# Patient Record
Sex: FEMALE | Race: BLACK OR AFRICAN AMERICAN | NOT HISPANIC OR LATINO | Employment: FULL TIME | URBAN - METROPOLITAN AREA
[De-identification: names, ages, dates, MRNs, and addresses within clinical notes are randomized per-mention and may not be internally consistent; named-entity substitution may affect disease eponyms.]

---

## 2023-09-13 ENCOUNTER — LAB (OUTPATIENT)
Dept: LAB | Facility: LAB | Age: 33
End: 2023-09-13

## 2023-09-13 LAB
ANTICARDIOLIPIN IGA ANTIBODY: 1.1 APL U/ML (ref 0–20)
ANTICARDIOLIPIN IGG ANTIBODY: <1.6 GPL U/ML (ref 0–20)
ANTICARDIOLIPIN IGM ANTIBODY: 0.2 MPL U/ML (ref 0–20)
BETA 2 GLYCOPROTEIN 1 IGA AB IN SERUM: 1 U/ML (ref 0–20)
BETA 2 GLYCOPROTEIN 1 IGG AB IN SERUM: <1.4 U/ML (ref 0–20)
BETA 2 GLYCOPROTEIN 1 IGM ANTIBODY IN SERUM: 0.4 U/ML (ref 0–20)
DEHYDROEPIANDROSTERONE SULFATE (DHEA-S) (UG/DL) IN SER/: 125 UG/DL (ref 12–379)
ERYTHROCYTE DISTRIBUTION WIDTH (RATIO) BY AUTOMATED COUNT: 15.9 % (ref 11.5–14.5)
ERYTHROCYTE MEAN CORPUSCULAR HEMOGLOBIN CONCENTRATION (G/DL) BY AUTOMATED: 29.1 G/DL (ref 32–36)
ERYTHROCYTE MEAN CORPUSCULAR VOLUME (FL) BY AUTOMATED COUNT: 73 FL (ref 80–100)
ERYTHROCYTES (10*6/UL) IN BLOOD BY AUTOMATED COUNT: 4.59 X10E12/L (ref 4–5.2)
ESTIMATED AVERAGE GLUCOSE FOR HBA1C: 114 MG/DL
FOLLITROPIN (IU/L) IN SER/PLAS: 7.9 IU/L
HEMATOCRIT (%) IN BLOOD BY AUTOMATED COUNT: 33.7 % (ref 36–46)
HEMOGLOBIN (G/DL) IN BLOOD: 9.8 G/DL (ref 12–16)
HEMOGLOBIN A1C/HEMOGLOBIN TOTAL IN BLOOD: 5.6 %
HEPATITIS B VIRUS SURFACE AG PRESENCE IN SERUM: NONREACTIVE
HEPATITIS C VIRUS AB PRESENCE IN SERUM: NONREACTIVE
HIV 1/ 2 AG/AB SCREEN: NONREACTIVE
LEUKOCYTES (10*3/UL) IN BLOOD BY AUTOMATED COUNT: 3.8 X10E9/L (ref 4.4–11.3)
NRBC (PER 100 WBCS) BY AUTOMATED COUNT: 0 /100 WBC (ref 0–0)
PLATELETS (10*3/UL) IN BLOOD AUTOMATED COUNT: 410 X10E9/L (ref 150–450)
PROLACTIN (UG/L) IN SER/PLAS: 8.3 UG/L (ref 3–20)
SYPHILIS TOTAL AB: NONREACTIVE
THYROTROPIN (MIU/L) IN SER/PLAS BY DETECTION LIMIT <= 0.05 MIU/L: 1.21 MIU/L (ref 0.44–3.98)

## 2023-09-14 LAB
ANA PATTERN: ABNORMAL
ANA TITER: ABNORMAL
ANTI-CENTROMERE: <0.2 AI
ANTI-CHROMATIN: <0.2 AI
ANTI-DNA (DS): <1 IU/ML
ANTI-JO-1 IGG: <0.2 AI
ANTI-NUCLEAR ANTIBODY (ANA): POSITIVE
ANTI-RIBOSOMAL P: <0.2 AI
ANTI-RNP: 0.3 AI
ANTI-SCL-70: <0.2 AI
ANTI-SM/RNP: <0.2 AI
ANTI-SM: <0.2 AI
ANTI-SSA: >8 AI
ANTI-SSB: 0.4 AI
CHLAMYDIA TRACH., AMPLIFIED: NEGATIVE
DILUTE RUSSELL VIPER VENOM TIME CONF: 0.97 RATIO
DILUTE RUSSELL VIPER VENOM TIME SCREEN: 0.9 RATIO
DILUTE RUSSELL VIPER VENOM TIME TEST RATIO: 0.93 RATIO
LUPUS ANTICOAGULANT INTERPRETATION: NORMAL
N. GONORRHEA, AMPLIFIED: NEGATIVE
NORMALIZED SILICA CLOTTING TIME (RATIO) OF PPP: 0.8 RATIO
SILICA CLOTTING TIME CONFIRMATION: 0.92 RATIO
SILICA CLOTTING TIME SCREEN: 0.74 RATIO
TRICHOMONAS VAGINALIS: NEGATIVE

## 2023-09-19 LAB
PROTEIN C ACTUAL/NORMAL IN PPP BY COAGULATION ASSAY: 102 %ACTIVITY (ref 70–150)
PROTEIN S ACTUAL/NORMAL IN PPP BY COAGULATION ASSAY: 87 %ACTIVITY (ref 64–150)
TESTOSTERONE FREE (CHAN): 1.8 PG/ML (ref 0.1–6.4)
TESTOSTERONE,TOTAL,LC-MS/MS: 20 NG/DL (ref 2–45)

## 2023-09-27 ENCOUNTER — HOSPITAL ENCOUNTER (INPATIENT)
Dept: DATA CONVERSION | Facility: HOSPITAL | Age: 33
LOS: 4 days | Discharge: HOME | DRG: 864 | End: 2023-10-01
Attending: INTERNAL MEDICINE | Admitting: INTERNAL MEDICINE

## 2023-09-27 DIAGNOSIS — D25.1 INTRAMURAL AND SUBMUCOUS LEIOMYOMA OF UTERUS: ICD-10-CM

## 2023-09-27 DIAGNOSIS — R50.9 FEVER OF UNKNOWN ORIGIN: Primary | ICD-10-CM

## 2023-09-27 DIAGNOSIS — D25.0 INTRAMURAL AND SUBMUCOUS LEIOMYOMA OF UTERUS: ICD-10-CM

## 2023-09-27 DIAGNOSIS — D64.9 ANEMIA, UNSPECIFIED TYPE: Chronic | ICD-10-CM

## 2023-09-27 LAB
ALBUMIN SERPL-MCNC: 3.4 GM/DL (ref 3.5–5)
ALBUMIN/GLOB SERPL: 0.7 RATIO (ref 1.5–3)
ALP BLD-CCNC: 93 U/L (ref 35–125)
ALT SERPL-CCNC: 37 U/L (ref 5–40)
ANION GAP SERPL CALCULATED.3IONS-SCNC: 12 MMOL/L (ref 0–19)
AST SERPL-CCNC: 29 U/L (ref 5–40)
BACTERIA SPEC CULT: NORMAL
BACTERIA UR QL AUTO: POSITIVE
BASOPHILS # BLD AUTO: 0.01 K/UL (ref 0–0.22)
BASOPHILS NFR BLD AUTO: 0.1 % (ref 0–1)
BILIRUB SERPL-MCNC: 0.7 MG/DL (ref 0.1–1.2)
BILIRUB UR QL STRIP.AUTO: NEGATIVE
BLOOD CULTURE RESULTS -LH SQ DATA CONVERSION: NORMAL
BLOOD CULTURE RESULTS -LH SQ DATA CONVERSION: NORMAL
BUN SERPL-MCNC: 9 MG/DL (ref 8–25)
BUN/CREAT SERPL: 10 RATIO (ref 8–21)
CALCIUM SERPL-MCNC: 8.9 MG/DL (ref 8.5–10.4)
CC # UR: NORMAL /UL
CHLORIDE SERPL-SCNC: 93 MMOL/L (ref 97–107)
CLARITY UR: CLEAR
CO2 SERPL-SCNC: 23 MMOL/L (ref 24–31)
COLOR UR: YELLOW
CREAT SERPL-MCNC: 0.9 MG/DL (ref 0.4–1.6)
DEPRECATED RDW RBC AUTO: 40.4 FL (ref 37–54)
DIFFERENTIAL METHOD BLD: ABNORMAL
EOSINOPHIL # BLD AUTO: 0.01 K/UL (ref 0–0.45)
EOSINOPHIL NFR BLD: 0.1 % (ref 0–3)
ERYTHROCYTE [DISTWIDTH] IN BLOOD BY AUTOMATED COUNT: 16.3 % (ref 11.7–15)
GFR SERPL CREATININE-BSD FRML MDRD: 88 ML/MIN/1.73 M2
GLOBULIN SER-MCNC: 5.2 G/DL (ref 1.9–3.7)
GLUCOSE SERPL-MCNC: 118 MG/DL (ref 65–99)
GLUCOSE UR STRIP.AUTO-MCNC: NEGATIVE MG/DL
HCG SERPL-ACNC: 1 MIU/ML
HCT VFR BLD AUTO: 25.4 % (ref 36–44)
HGB BLD-MCNC: 8 GM/DL (ref 12–15)
HGB UR QL STRIP.AUTO: 77 /HPF (ref 0–3)
HGB UR QL: ABNORMAL
HYALINE CASTS UR QL AUTO: ABNORMAL /LPF
IMM GRANULOCYTES # BLD AUTO: 0.07 K/UL (ref 0–0.1)
KETONES UR QL STRIP.AUTO: NEGATIVE
LACTATE BLDV-SCNC: 1.2 MMOL/L (ref 0.4–2)
LEUKOCYTE ESTERASE UR QL STRIP.AUTO: ABNORMAL
LIPASE SERPL-CCNC: 16 U/L (ref 16–63)
LYMPHOCYTES # BLD AUTO: 0.8 K/UL (ref 1.2–3.2)
LYMPHOCYTES NFR BLD MANUAL: 8 % (ref 20–40)
MCH RBC QN AUTO: 21.6 PG (ref 26–34)
MCHC RBC AUTO-ENTMCNC: 31.5 % (ref 31–37)
MCV RBC AUTO: 68.5 FL (ref 80–100)
MICROSCOPIC (UA): ABNORMAL
MONOCYTES # BLD AUTO: 1.1 K/UL (ref 0–0.8)
MONOCYTES NFR BLD MANUAL: 11 % (ref 0–8)
NEUTROPHILS # BLD AUTO: 7.99 K/UL
NEUTROPHILS # BLD AUTO: 7.99 K/UL (ref 1.8–7.7)
NEUTROPHILS.IMMATURE NFR BLD: 0.7 % (ref 0–1)
NEUTS SEG NFR BLD: 80.1 % (ref 50–70)
NITRITE UR QL STRIP.AUTO: NEGATIVE
NRBC BLD-RTO: 0 /100 WBC
PH UR STRIP.AUTO: 6 [PH] (ref 4.6–8)
PLATELET # BLD AUTO: 474 K/UL (ref 150–450)
PMV BLD AUTO: 10.2 CU (ref 7–12.6)
POTASSIUM SERPL-SCNC: 3.5 MMOL/L (ref 3.4–5.1)
PROT SERPL-MCNC: 8.6 G/DL (ref 5.9–7.9)
PROT UR STRIP.AUTO-MCNC: 50 MG/DL
RBC # BLD AUTO: 3.71 M/UL (ref 4–4.9)
REPORT STATUS -LH SQ DATA CONVERSION: NORMAL
SERVICE CMNT-IMP: NORMAL
SODIUM SERPL-SCNC: 128 MMOL/L (ref 133–145)
SOURCE,MICRO -LH SQ DATA CONVERSION: NORMAL
SOURCE,MICRO -LH SQ DATA CONVERSION: NORMAL
SP GR UR STRIP.AUTO: 1.03 (ref 1–1.03)
SPECIMEN SOURCE: NORMAL
SQUAMOUS UR QL AUTO: ABNORMAL /HPF
URINE CULTURE: ABNORMAL
UROBILINOGEN UR QL STRIP.AUTO: 2 MG/DL (ref 0–1)
WBC # BLD AUTO: 10 K/UL (ref 4.5–11)
WBC #/AREA URNS AUTO: 14 /HPF (ref 0–3)

## 2023-09-27 PROCEDURE — 85025 COMPLETE CBC W/AUTO DIFF WBC: CPT

## 2023-09-27 PROCEDURE — 9990 CHARGE CONVERSION

## 2023-09-27 PROCEDURE — REL CHARGE CONVERSION

## 2023-09-27 PROCEDURE — 83690 ASSAY OF LIPASE: CPT

## 2023-09-27 PROCEDURE — 96375 TX/PRO/DX INJ NEW DRUG ADDON: CPT | Mod: XU

## 2023-09-27 PROCEDURE — G1004 CDSM NDSC: HCPCS

## 2023-09-27 PROCEDURE — 76856 US EXAM PELVIC COMPLETE: CPT

## 2023-09-27 PROCEDURE — 83605 ASSAY OF LACTIC ACID: CPT

## 2023-09-27 PROCEDURE — 96366 THER/PROPH/DIAG IV INF ADDON: CPT

## 2023-09-27 PROCEDURE — 87086 URINE CULTURE/COLONY COUNT: CPT

## 2023-09-27 PROCEDURE — 74177 CT ABD & PELVIS W/CONTRAST: CPT | Mod: ME

## 2023-09-27 PROCEDURE — 99285 EMERGENCY DEPT VISIT HI MDM: CPT | Mod: 25

## 2023-09-27 PROCEDURE — 82728 ASSAY OF FERRITIN: CPT

## 2023-09-27 PROCEDURE — 93975 VASCULAR STUDY: CPT

## 2023-09-27 PROCEDURE — 83550 IRON BINDING TEST: CPT

## 2023-09-27 PROCEDURE — 84702 CHORIONIC GONADOTROPIN TEST: CPT

## 2023-09-27 PROCEDURE — 93005 ELECTROCARDIOGRAM TRACING: CPT

## 2023-09-27 PROCEDURE — 83540 ASSAY OF IRON: CPT

## 2023-09-27 PROCEDURE — 36415 COLL VENOUS BLD VENIPUNCTURE: CPT

## 2023-09-27 PROCEDURE — 87040 BLOOD CULTURE FOR BACTERIA: CPT

## 2023-09-27 PROCEDURE — 82947 ASSAY GLUCOSE BLOOD QUANT: CPT

## 2023-09-27 PROCEDURE — 81001 URINALYSIS AUTO W/SCOPE: CPT

## 2023-09-27 PROCEDURE — 96365 THER/PROPH/DIAG IV INF INIT: CPT | Mod: XU

## 2023-09-27 PROCEDURE — 80053 COMPREHEN METABOLIC PANEL: CPT

## 2023-09-28 LAB
ANION GAP SERPL CALCULATED.3IONS-SCNC: 15 MMOL/L (ref 0–19)
BUN SERPL-MCNC: 8 MG/DL (ref 8–25)
BUN/CREAT SERPL: 11.4 RATIO (ref 8–21)
CALCIUM SERPL-MCNC: 8.4 MG/DL (ref 8.5–10.4)
CHLORIDE SERPL-SCNC: 102 MMOL/L (ref 97–107)
CO2 SERPL-SCNC: 18 MMOL/L (ref 24–31)
CREAT SERPL-MCNC: 0.7 MG/DL (ref 0.4–1.6)
DEPRECATED RDW RBC AUTO: 41.5 FL (ref 37–54)
ERYTHROCYTE [DISTWIDTH] IN BLOOD BY AUTOMATED COUNT: 16.4 % (ref 11.7–15)
FERRITIN SERPL-MCNC: 161 NG/ML (ref 13–150)
GFR SERPL CREATININE-BSD FRML MDRD: 119 ML/MIN/1.73 M2
GLUCOSE SERPL-MCNC: 96 MG/DL (ref 65–99)
HCT VFR BLD AUTO: 24.8 % (ref 36–44)
HGB BLD-MCNC: 7.5 GM/DL (ref 12–15)
IRON SATN MFR SERPL: 7.5 % (ref 12–50)
IRON SERPL-MCNC: 20 UG/DL (ref 30–160)
MCH RBC QN AUTO: 20.9 PG (ref 26–34)
MCHC RBC AUTO-ENTMCNC: 30.2 % (ref 31–37)
MCV RBC AUTO: 69.3 FL (ref 80–100)
NOTE (COV): NORMAL
NRBC BLD-RTO: 0 /100 WBC
PLATELET # BLD AUTO: 501 K/UL (ref 150–450)
PMV BLD AUTO: 9.9 CU (ref 7–12.6)
POTASSIUM SERPL-SCNC: 3.9 MMOL/L (ref 3.4–5.1)
RBC # BLD AUTO: 3.58 M/UL (ref 4–4.9)
SARS-COV-2 RNA RESP QL NAA+PROBE: NEGATIVE
SODIUM SERPL-SCNC: 135 MMOL/L (ref 133–145)
SPECIMEN SOURCE (COV): NORMAL
TIBC SERPL-MCNC: 268 UG/DL (ref 228–428)
WBC # BLD AUTO: 13 K/UL (ref 4.5–11)

## 2023-09-28 PROCEDURE — 81001 URINALYSIS AUTO W/SCOPE: CPT

## 2023-09-28 PROCEDURE — 9990 CHARGE CONVERSION

## 2023-09-28 PROCEDURE — 84702 CHORIONIC GONADOTROPIN TEST: CPT

## 2023-09-28 PROCEDURE — 80048 BASIC METABOLIC PNL TOTAL CA: CPT

## 2023-09-28 PROCEDURE — 83605 ASSAY OF LACTIC ACID: CPT

## 2023-09-28 PROCEDURE — 87635 SARS-COV-2 COVID-19 AMP PRB: CPT

## 2023-09-28 PROCEDURE — 83690 ASSAY OF LIPASE: CPT

## 2023-09-28 PROCEDURE — 80053 COMPREHEN METABOLIC PANEL: CPT

## 2023-09-28 PROCEDURE — 85027 COMPLETE CBC AUTOMATED: CPT

## 2023-09-28 PROCEDURE — 85025 COMPLETE CBC W/AUTO DIFF WBC: CPT

## 2023-09-28 PROCEDURE — 36415 COLL VENOUS BLD VENIPUNCTURE: CPT

## 2023-09-28 RX ORDER — POLYETHYLENE GLYCOL 3350 17 G/17G
17 POWDER, FOR SOLUTION ORAL DAILY PRN
Status: DISCONTINUED | OUTPATIENT
Start: 2023-09-30 | End: 2023-09-29 | Stop reason: HOSPADM

## 2023-09-28 RX ORDER — ONDANSETRON HYDROCHLORIDE 2 MG/ML
4 INJECTION, SOLUTION INTRAVENOUS EVERY 6 HOURS PRN
Status: DISCONTINUED | OUTPATIENT
Start: 2023-09-30 | End: 2023-09-29 | Stop reason: HOSPADM

## 2023-09-28 RX ORDER — ADHESIVE BANDAGE
30 BANDAGE TOPICAL DAILY PRN
Status: DISCONTINUED | OUTPATIENT
Start: 2023-09-30 | End: 2023-09-29 | Stop reason: HOSPADM

## 2023-09-28 RX ORDER — CEFTRIAXONE 1 G/50ML
1 INJECTION, SOLUTION INTRAVENOUS EVERY 24 HOURS
Status: DISCONTINUED | OUTPATIENT
Start: 2023-09-30 | End: 2023-09-29 | Stop reason: HOSPADM

## 2023-09-28 RX ORDER — ACETAMINOPHEN 325 MG/1
650 TABLET ORAL EVERY 6 HOURS PRN
Status: DISCONTINUED | OUTPATIENT
Start: 2023-09-30 | End: 2023-09-29 | Stop reason: HOSPADM

## 2023-09-28 RX ORDER — GUAIFENESIN 100 MG/5ML
100 SOLUTION ORAL EVERY 4 HOURS PRN
Status: DISCONTINUED | OUTPATIENT
Start: 2023-09-30 | End: 2023-09-29 | Stop reason: HOSPADM

## 2023-09-28 RX ORDER — AMOXICILLIN 250 MG
2 CAPSULE ORAL NIGHTLY PRN
Status: DISCONTINUED | OUTPATIENT
Start: 2023-09-30 | End: 2023-09-29 | Stop reason: HOSPADM

## 2023-09-28 RX ORDER — ALUMINUM HYDROXIDE, MAGNESIUM HYDROXIDE, AND SIMETHICONE 1200; 120; 1200 MG/30ML; MG/30ML; MG/30ML
30 SUSPENSION ORAL EVERY 4 HOURS PRN
Status: DISCONTINUED | OUTPATIENT
Start: 2023-09-30 | End: 2023-09-29 | Stop reason: HOSPADM

## 2023-09-29 LAB
ANION GAP SERPL CALCULATED.3IONS-SCNC: 13 MMOL/L (ref 0–19)
BLOOD CULTURE RESULTS -LH SQ DATA CONVERSION: NORMAL
BLOOD CULTURE RESULTS -LH SQ DATA CONVERSION: NORMAL
BUN SERPL-MCNC: 6 MG/DL (ref 8–25)
BUN/CREAT SERPL: 7.5 RATIO (ref 8–21)
C TRACH RRNA SPEC QL NAA+PROBE: ABNORMAL
CALCIUM SERPL-MCNC: 8.4 MG/DL (ref 8.5–10.4)
CHLORIDE SERPL-SCNC: 101 MMOL/L (ref 97–107)
CO2 SERPL-SCNC: 21 MMOL/L (ref 24–31)
CREAT SERPL-MCNC: 0.8 MG/DL (ref 0.4–1.6)
DEPRECATED RDW RBC AUTO: 42.5 FL (ref 37–54)
DRUG SCREEN COMMENT UR-IMP: ABNORMAL
ERYTHROCYTE [DISTWIDTH] IN BLOOD BY AUTOMATED COUNT: 16.7 % (ref 11.7–15)
GFR SERPL CREATININE-BSD FRML MDRD: 101 ML/MIN/1.73 M2
GLUCOSE SERPL-MCNC: 102 MG/DL (ref 65–99)
HCT VFR BLD AUTO: 23.3 % (ref 36–44)
HGB BLD-MCNC: 7.1 GM/DL (ref 12–15)
MCH RBC QN AUTO: 21.3 PG (ref 26–34)
MCHC RBC AUTO-ENTMCNC: 30.5 % (ref 31–37)
MCV RBC AUTO: 70 FL (ref 80–100)
N GONORRHOEA RRNA SPEC QL NAA+PROBE: ABNORMAL
NRBC BLD-RTO: 0 /100 WBC
PLATELET # BLD AUTO: 522 K/UL (ref 150–450)
PMV BLD AUTO: 10.5 CU (ref 7–12.6)
POTASSIUM SERPL-SCNC: 3.4 MMOL/L (ref 3.4–5.1)
RBC # BLD AUTO: 3.33 M/UL (ref 4–4.9)
SODIUM SERPL-SCNC: 135 MMOL/L (ref 133–145)
SOURCE,MICRO -LH SQ DATA CONVERSION: NORMAL
SOURCE,MICRO -LH SQ DATA CONVERSION: NORMAL
SPECIMEN SOURCE: ABNORMAL
WBC # BLD AUTO: 11.2 K/UL (ref 4.5–11)

## 2023-09-29 PROCEDURE — 82728 ASSAY OF FERRITIN: CPT

## 2023-09-29 PROCEDURE — 85027 COMPLETE CBC AUTOMATED: CPT

## 2023-09-29 PROCEDURE — 9990 CHARGE CONVERSION

## 2023-09-29 PROCEDURE — 87635 SARS-COV-2 COVID-19 AMP PRB: CPT

## 2023-09-29 PROCEDURE — 87591 N.GONORRHOEAE DNA AMP PROB: CPT

## 2023-09-29 PROCEDURE — 87040 BLOOD CULTURE FOR BACTERIA: CPT

## 2023-09-29 PROCEDURE — 83540 ASSAY OF IRON: CPT

## 2023-09-29 PROCEDURE — 83550 IRON BINDING TEST: CPT

## 2023-09-29 PROCEDURE — 80048 BASIC METABOLIC PNL TOTAL CA: CPT

## 2023-09-29 PROCEDURE — 87491 CHLMYD TRACH DNA AMP PROBE: CPT

## 2023-09-30 PROBLEM — D64.9 ANEMIA: Chronic | Status: ACTIVE | Noted: 2023-09-30

## 2023-09-30 PROBLEM — D25.9 UTERINE FIBROID: Status: ACTIVE | Noted: 2023-09-30

## 2023-09-30 PROBLEM — R50.9 FEVER OF UNKNOWN ORIGIN: Status: ACTIVE | Noted: 2023-09-30

## 2023-09-30 PROBLEM — N83.201 CYST OF RIGHT OVARY: Status: ACTIVE | Noted: 2023-09-30

## 2023-09-30 LAB
ANION GAP SERPL CALC-SCNC: 11 MMOL/L
BASOPHILS # BLD AUTO: 0.02 X10*3/UL (ref 0–0.1)
BASOPHILS NFR BLD AUTO: 0.2 %
BUN SERPL-MCNC: 5 MG/DL (ref 8–25)
CALCIUM SERPL-MCNC: 8.6 MG/DL (ref 8.5–10.4)
CHLORIDE SERPL-SCNC: 96 MMOL/L (ref 97–107)
CO2 SERPL-SCNC: 25 MMOL/L (ref 24–31)
CREAT SERPL-MCNC: 0.6 MG/DL (ref 0.4–1.6)
EOSINOPHIL # BLD AUTO: 0.14 X10*3/UL (ref 0–0.7)
EOSINOPHIL NFR BLD AUTO: 1.2 %
ERYTHROCYTE [DISTWIDTH] IN BLOOD BY AUTOMATED COUNT: 17 % (ref 11.5–14.5)
GFR SERPL CREATININE-BSD FRML MDRD: >90 ML/MIN/1.73M*2
GLUCOSE SERPL-MCNC: 112 MG/DL (ref 65–99)
HCT VFR BLD AUTO: 23.6 % (ref 36–46)
HGB BLD-MCNC: 7.1 G/DL (ref 12–16)
IMM GRANULOCYTES # BLD AUTO: 0.12 X10*3/UL (ref 0–0.7)
IMM GRANULOCYTES NFR BLD AUTO: 1.1 % (ref 0–0.9)
LYMPHOCYTES # BLD AUTO: 0.88 X10*3/UL (ref 1.2–4.8)
LYMPHOCYTES NFR BLD AUTO: 7.7 %
MCH RBC QN AUTO: 20.8 PG (ref 26–34)
MCHC RBC AUTO-ENTMCNC: 30.1 G/DL (ref 32–36)
MCV RBC AUTO: 69 FL (ref 80–100)
MONOCYTES # BLD AUTO: 1.27 X10*3/UL (ref 0.1–1)
MONOCYTES NFR BLD AUTO: 11.2 %
NEUTROPHILS # BLD AUTO: 8.93 X10*3/UL (ref 1.2–7.7)
NEUTROPHILS NFR BLD AUTO: 78.6 %
NRBC BLD-RTO: 0 /100 WBCS (ref 0–0)
PLATELET # BLD AUTO: 577 X10*3/UL (ref 150–450)
PMV BLD AUTO: 10.3 FL (ref 7.5–11.5)
POTASSIUM SERPL-SCNC: 3.5 MMOL/L (ref 3.4–5.1)
RBC # BLD AUTO: 3.41 X10*6/UL (ref 4–5.2)
SODIUM SERPL-SCNC: 132 MMOL/L (ref 133–145)
WBC # BLD AUTO: 11.4 X10*3/UL (ref 4.4–11.3)

## 2023-09-30 PROCEDURE — 2500000004 HC RX 250 GENERAL PHARMACY W/ HCPCS (ALT 636 FOR OP/ED): Performed by: INTERNAL MEDICINE

## 2023-09-30 PROCEDURE — 85027 COMPLETE CBC AUTOMATED: CPT

## 2023-09-30 PROCEDURE — 87040 BLOOD CULTURE FOR BACTERIA: CPT

## 2023-09-30 PROCEDURE — 36415 COLL VENOUS BLD VENIPUNCTURE: CPT | Performed by: OBSTETRICS & GYNECOLOGY

## 2023-09-30 PROCEDURE — 9990 CHARGE CONVERSION

## 2023-09-30 PROCEDURE — 1200000002 HC GENERAL ROOM WITH TELEMETRY DAILY

## 2023-09-30 PROCEDURE — 85025 COMPLETE CBC W/AUTO DIFF WBC: CPT | Performed by: OBSTETRICS & GYNECOLOGY

## 2023-09-30 PROCEDURE — 87086 URINE CULTURE/COLONY COUNT: CPT

## 2023-09-30 PROCEDURE — 80048 BASIC METABOLIC PNL TOTAL CA: CPT | Performed by: OBSTETRICS & GYNECOLOGY

## 2023-09-30 PROCEDURE — REL CHARGE CONVERSION

## 2023-09-30 PROCEDURE — 80048 BASIC METABOLIC PNL TOTAL CA: CPT

## 2023-09-30 RX ORDER — CEFTRIAXONE 1 G/50ML
1 INJECTION, SOLUTION INTRAVENOUS EVERY 24 HOURS
Status: DISCONTINUED | OUTPATIENT
Start: 2023-09-30 | End: 2023-09-30

## 2023-09-30 RX ORDER — ACETAMINOPHEN 325 MG/1
650 TABLET ORAL EVERY 6 HOURS PRN
Status: DISCONTINUED | OUTPATIENT
Start: 2023-09-30 | End: 2023-10-01 | Stop reason: HOSPADM

## 2023-09-30 RX ORDER — ALUMINUM HYDROXIDE, MAGNESIUM HYDROXIDE, AND SIMETHICONE 1200; 120; 1200 MG/30ML; MG/30ML; MG/30ML
30 SUSPENSION ORAL EVERY 4 HOURS PRN
Status: DISCONTINUED | OUTPATIENT
Start: 2023-09-30 | End: 2023-10-01 | Stop reason: HOSPADM

## 2023-09-30 RX ORDER — POLYETHYLENE GLYCOL 3350 17 G/17G
17 POWDER, FOR SOLUTION ORAL DAILY PRN
Status: DISCONTINUED | OUTPATIENT
Start: 2023-09-30 | End: 2023-10-01 | Stop reason: HOSPADM

## 2023-09-30 RX ORDER — ONDANSETRON HYDROCHLORIDE 2 MG/ML
4 INJECTION, SOLUTION INTRAVENOUS EVERY 6 HOURS PRN
Status: DISCONTINUED | OUTPATIENT
Start: 2023-09-30 | End: 2023-10-01 | Stop reason: HOSPADM

## 2023-09-30 RX ORDER — IBUPROFEN 600 MG/1
TABLET ORAL
Status: DISPENSED
Start: 2023-09-30 | End: 2023-09-30

## 2023-09-30 RX ORDER — ADHESIVE BANDAGE
30 BANDAGE TOPICAL DAILY PRN
Status: DISCONTINUED | OUTPATIENT
Start: 2023-09-30 | End: 2023-10-01 | Stop reason: HOSPADM

## 2023-09-30 RX ORDER — AMOXICILLIN 250 MG
2 CAPSULE ORAL NIGHTLY PRN
Status: DISCONTINUED | OUTPATIENT
Start: 2023-09-30 | End: 2023-10-01 | Stop reason: HOSPADM

## 2023-09-30 RX ORDER — IBUPROFEN 600 MG/1
600 TABLET ORAL EVERY 6 HOURS PRN
Status: DISCONTINUED | OUTPATIENT
Start: 2023-09-30 | End: 2023-10-01 | Stop reason: HOSPADM

## 2023-09-30 RX ORDER — GUAIFENESIN 100 MG/5ML
100 SOLUTION ORAL EVERY 4 HOURS PRN
Status: DISCONTINUED | OUTPATIENT
Start: 2023-09-30 | End: 2023-10-01 | Stop reason: HOSPADM

## 2023-09-30 RX ADMIN — PIPERACILLIN SODIUM AND TAZOBACTAM SODIUM 4.5 G: 4; .5 INJECTION, SOLUTION INTRAVENOUS at 21:14

## 2023-09-30 RX ADMIN — PIPERACILLIN SODIUM AND TAZOBACTAM SODIUM 4.5 G: 4; .5 INJECTION, SOLUTION INTRAVENOUS at 15:19

## 2023-09-30 RX ADMIN — PIPERACILLIN SODIUM AND TAZOBACTAM SODIUM 4.5 G: 4; .5 INJECTION, SOLUTION INTRAVENOUS at 06:00

## 2023-09-30 ASSESSMENT — COGNITIVE AND FUNCTIONAL STATUS - GENERAL
MOBILITY SCORE: 24
DAILY ACTIVITIY SCORE: 24

## 2023-09-30 ASSESSMENT — PAIN - FUNCTIONAL ASSESSMENT
PAIN_FUNCTIONAL_ASSESSMENT: 0-10

## 2023-09-30 ASSESSMENT — PAIN SCALES - GENERAL
PAINLEVEL_OUTOF10: 0 - NO PAIN
PAINLEVEL_OUTOF10: 0 - NO PAIN
PAINLEVEL_OUTOF10: 4

## 2023-09-30 NOTE — PROGRESS NOTES
"Daria Isaac is a 32 y.o. female on day 3 of admission presenting with Fever of unknown origin.    Subjective   Interval History:   Afebrile, no chills  Reports moderate pelvic pain  No nausea vomiting or diarrhea    Objective   Physical Exam  General: no acute distress  Psych: awake, alert  Head: Anicteric with no conjunctival injection  ENT: Midline nasal septum with no mucosal lesions  Neck: Supple with no obvious masses  Chest: Clear to auscultation bilaterally with no rales or rhonchi  Cardiovascular system: Regular rate and rhythm with no rubs murmurs or gallops  Abdomen: Soft, nontender, bowel sounds normoactive  Extremities: No edema with no cyanosis or clubbing  Skin: No rash, nodules  Musculoskeletal system no joint swelling or tenderness    Range of Vitals (last 24 hours)  Heart Rate:  [73-93]   Temp:  [36.6 °C (97.9 °F)-37.1 °C (98.8 °F)]   Resp:  [18]   BP: (133-143)/(76-90)   SpO2:  [99 %-100 %]   Daily Weight  09/28/23 : 105 kg (231 lb 7.7 oz)    Body mass index is 32.29 kg/m².    Relevant Results  Labs:  Results from last 72 hours   Lab Units 09/28/23  0806 09/27/23  1653   WBC AUTO K/UL 13.0* 10.0   HEMOGLOBIN GM/DL 7.5* 8.0*   HEMATOCRIT % 24.8* 25.4*   PLATELETS AUTO K/* 474*   LYMPHO PCT MAN %  --  8.00*   MONO PCT MAN %  --  11.00*     Results from last 72 hours   Lab Units 09/28/23  0806   SODIUM MMOL/L 135   POTASSIUM MMOL/L 3.9   CHLORIDE MMOL/L 102   CO2 MMOL/L 18*   BUN MG/DL 8   CREATININE MG/DL 0.7   GLUCOSE MG/DL 96   CALCIUM MG/DL 8.4*   ANION GAP MMOL/L 15   ESTIMATED GFR mL/min/1.73 m2 119     Results from last 72 hours   Lab Units 09/27/23  1653   ALK PHOS U/L 93   BILIRUBIN TOTAL MG/DL 0.7   PROTEIN TOTAL G/DL 8.6*   ALT U/L 37   AST U/L 29   ALBUMIN GM/DL 3.4*     Estimated Creatinine Clearance: 125 mL/min (by C-G formula based on SCr of 0.7 mg/dL).  No results found for: \"CRP\"  No results found for the last 7 days.    Imaging:  CT abdomen pelvis w IV contrast    Result " Date: 9/27/2023  PROCEDURE:         ABD PELV W ORAL AND IV CONTRAST - WCT  3205 REASON FOR EXAM: Abdominal pain, acute, nonlocalized RESULT: MRN: [id number] Patient Name: CLAUDIA MUNOZ STUDY: ABD PELV W ORAL AND IV CONTRAST; 9/27/2023 8:39 pm INDICATION: Right-sided pain with history of ovarian cyst COMPARISON: 08/30/2020. ACCESSION NUMBER(S): [id number] ORDERING CLINICIAN: ZAHEER OSEI TECHNIQUE: Oral contrast in the form of water was administered. 75 ml Omnipaque 350 was injected intravenously. CT of the Abdomen and Pelvis without and with intravenous contrast was performed. Axial, sagittal and coronal reformatted images were reviewed. FINDINGS: Lower Chest: within normal limits. Abdomen: Liver: within normal limits. Bile Ducts: Normal caliber. Gallbladder: No calcified gallstones. Normal caliber wall. Pancreas: within normal limits. Spleen: within normal limits. Adrenals: within normal limits. Kidneys: within normal limits. There is no obstructive uropathy on either side. Pelvis: Reproductive Organs: There is a 9.9 x 10.1 x 11.2 cm right ovarian cyst containing solitary septation with the cyst wall appearing rather thickened. There is a 3.6 x 3.9 x 3.6 cm left ovarian cyst seen.  The uterus is enlarged with a minimally hyperdense mass seen centrally measuring 6 cm in size. Ureters: within normal limits. Bladder: within normal limits. Bowel: Normal caliber. There is no abnormal bowel wall thickening. No appendicitis is seen. Mesenteric Lymph Nodes: No enlarged mesenteric lymph nodes. Peritoneum: No ascites or free air, no fluid collection. Vessels:  within normal limits. Retroperitoneum: within normal limits. Abdominal Wall: within normal limits. Bones: within normal limits. IMPRESSION: Bilateral ovarian cysts, right larger than left with that on the right containing septation. Differential diagnosis would include bilateral endometriomas, chocolate cysts, or even ovarian neoplasms. No ascites or pleural  effusion observed. Enlarged uterus containing a 6 cm minimally hyperdense mass centrally. This could represent large submucosal leiomyoma or endometrial abnormality with endometrial carcinoma not excluded. MACRO: None. Dictation workstation:   [id number] This exam is available in DICOM format to non-affiliated healthcare facilities on a secure media free searchable basis with prior patient authorization.  The patient exposure is reported to a radiation dose index registry.  All CT examinations are performed with one or more of the following dose reduction techniques: Automated Exposure Control, Adjustment of mA and/or KV according to patient size, or use of iterative reconstruction techniques. Original Interpreting Physician:   MARIO NIEVES M.D. Original Transcribed by/Date: MMODAL Sep 27 2023  5:15P Original Electronically Signed by/Date: MARIO NIEVES M.D. Sep 27 2023 10:08P Addendum Interpreting Physician: Addendum Transcribed by/Date: NO ADDENDUM Addendum Electronically Signed by/Date:     US pelvis    Result Date: 9/27/2023  PROCEDURE:         PELVIS NON OB COMPLETE - WUS  2502 REASON FOR EXAM: abd pain RESULT: MRN: [id number] Patient Name: CLAUDIA MUNOZ STUDY: PELVIS NON OB COMPLETE; 9/27/2023 8:10 pm INDICATION: Right-sided pain COMPARISON: None. ACCESSION NUMBER(S): [id number] ORDERING CLINICIAN: ZAHEER OSEI TECHNIQUE: Grayscale and color Doppler imaging of the pelvis were performed. Transabdominal technique was utilized with patient refusing transvaginal study. FINDINGS: Uterus: Size: 11.6 x 8.8 x 7.3 cm. There is heterogeneous mass seen centrally within the enlarged uterus measuring 5.4 x 6.1 x 6.1 cm in size. A normal endometrium is not seen. Ovaries: There is normal color-flow with no sign of ovarian torsion. Right ovary: 11.8 x 10.4 x 11.0 cm. There is an 8.9 x 9.2 x 10.2 cm complex right ovarian cyst containing low-level internal echoes and exhibiting enhanced through transmission. Right ovary  volume: 709.4 ml Left ovary: 5.9 x 4.5 x 6.1 cm. There is a 3.4 x 3.6 x 2.6 cm left ovarian cyst seen. Left ovary volume: 84.4 ml Duplex Doppler interrogation of each ovary including color flow and spectral waveform analysis shows normal arterial and venous flow without ovarian torsion. There is no free fluid in the pelvis. IMPRESSION: Enlarged uterus with a 5.4 x 6.1 x 6.1 cm heterogeneous mass seen centrally within the uterus. This may represent a large submucosal leiomyoma or could represent markedly thickened endometrium with endometrial carcinoma not excluded. 8.9 x 9.2 x 10.2 cm complex right ovarian cyst. I suspect that this most likely represents very large chocolate cyst or endometrioma. 2.6 x 3.4 x 3.6 cm simple left ovarian cyst. No ovarian torsion. MACRO: None. Dictation workstation:   [id number] Original Interpreting Physician:   MARIO NIEVES M.D. Original Transcribed by/Date: MMODAL Sep 27 2023  5:15P Original Electronically Signed by/Date: MARIO NIEVES M.D. Sep 27 2023  9:57P Addendum Interpreting Physician: Addendum Transcribed by/Date: NO ADDENDUM Addendum Electronically Signed by/Date:     1) SIRS,   Clinical Status Improving,   Details rule out sepsis,   Plan IV antibiotics, follow-up blood cultures ;     2) Fever,   Details Unknown etiology, rule out UTI,   Plan Monitor;     3) Pyuria,   Details versus UTI,   Plan IV Zosyn, follow culture;     4) Computed Tomography Result Abnormal,   Details CT abd/pelvis showed bilateral ovarian cysts, right larger than left, enlarged uterus,   Plan GYN consultation;     5) AP,   IV Zosyn  Follow-up urine culture  Follow-up blood cultures  Monitor WBC and temperature  Monitor renal function  GYN follow-up  Plan is to de-escalate to Augmentin, total of 7 days      Phillip Scanlon MD

## 2023-09-30 NOTE — PROGRESS NOTES
"Daria Isaac is a 32 y.o. female on day 3 of admission presenting with Fever of unknown origin. She has known uterine fibroid and right ovarian cyst > 3 months and following with ObGyn outpatient for this. She has had anemia due to abnormal uterine bleeding, currently stable.    Subjective   Pt feeling better overnight. Abdominal discomfort continues, unchanged. She reports minimal vaginal spotting. She reports no more fever or chills symptoms, is urinating, ambulating, tolerating PO.     Objective     Physical Exam  Constitutional:       Appearance: Normal appearance.   HENT:      Head: Normocephalic.      Mouth/Throat:      Mouth: Mucous membranes are moist.   Cardiovascular:      Rate and Rhythm: Normal rate and regular rhythm.   Pulmonary:      Effort: Pulmonary effort is normal.      Breath sounds: Normal breath sounds.   Abdominal:      General: Abdomen is flat.      Palpations: Abdomen is soft.   Genitourinary:     General: Normal vulva.      Vagina: No vaginal discharge.   Musculoskeletal:         General: Normal range of motion.   Skin:     General: Skin is warm.   Neurological:      Mental Status: She is alert.         Last Recorded Vitals  Blood pressure 133/79, pulse 77, temperature 36.7 °C (98.1 °F), temperature source Oral, resp. rate 18, height 1.803 m (5' 11\"), weight 105 kg, SpO2 100 %.  Intake/Output last 3 Shifts:  No intake/output data recorded.    Relevant Results               Assessment/Plan   Active Problems:  There are no active Hospital Problems.      Fever of unknown origin  -last fever at 11pm on 9/28  -Pt on Zosyn and following with infectious disease, will await further recommendations  -Neg urine and blood culture  -am CBC pending    Fibroid uterus  -Bleeding stable  -Follows with outpatient gyn    Ovarian cyst  -Stable since July 23  -Follows with outpatient gyn    Anemia  -Likely related to abnormal uterine bleeding  -Stable, and pt reports minimal vaginal bleeding currently     "     I spent 30 minutes in the professional and overall care of this patient.      Radha Milligan MD

## 2023-10-01 ENCOUNTER — APPOINTMENT (OUTPATIENT)
Dept: RADIOLOGY | Facility: HOSPITAL | Age: 33
End: 2023-10-01

## 2023-10-01 VITALS
HEIGHT: 71 IN | WEIGHT: 231.48 LBS | OXYGEN SATURATION: 96 % | DIASTOLIC BLOOD PRESSURE: 69 MMHG | SYSTOLIC BLOOD PRESSURE: 133 MMHG | HEART RATE: 109 BPM | RESPIRATION RATE: 18 BRPM | TEMPERATURE: 102 F | BODY MASS INDEX: 32.41 KG/M2

## 2023-10-01 LAB
ANION GAP SERPL CALC-SCNC: 10 MMOL/L
BUN SERPL-MCNC: 4 MG/DL (ref 8–25)
CALCIUM SERPL-MCNC: 8.5 MG/DL (ref 8.5–10.4)
CHLORIDE SERPL-SCNC: 100 MMOL/L (ref 97–107)
CO2 SERPL-SCNC: 26 MMOL/L (ref 24–31)
CREAT SERPL-MCNC: 0.6 MG/DL (ref 0.4–1.6)
ERYTHROCYTE [DISTWIDTH] IN BLOOD BY AUTOMATED COUNT: 16.7 % (ref 11.5–14.5)
GFR SERPL CREATININE-BSD FRML MDRD: >90 ML/MIN/1.73M*2
GLUCOSE SERPL-MCNC: 113 MG/DL (ref 65–99)
HCT VFR BLD AUTO: 22.9 % (ref 36–46)
HGB BLD-MCNC: 7 G/DL (ref 12–16)
MCH RBC QN AUTO: 21 PG (ref 26–34)
MCHC RBC AUTO-ENTMCNC: 30.6 G/DL (ref 32–36)
MCV RBC AUTO: 69 FL (ref 80–100)
NRBC BLD-RTO: 0 /100 WBCS (ref 0–0)
PLATELET # BLD AUTO: 637 X10*3/UL (ref 150–450)
PMV BLD AUTO: 9.5 FL (ref 7.5–11.5)
POTASSIUM SERPL-SCNC: 3.4 MMOL/L (ref 3.4–5.1)
RBC # BLD AUTO: 3.33 X10*6/UL (ref 4–5.2)
SODIUM SERPL-SCNC: 136 MMOL/L (ref 133–145)
WBC # BLD AUTO: 10.9 X10*3/UL (ref 4.4–11.3)

## 2023-10-01 PROCEDURE — 2500000001 HC RX 250 WO HCPCS SELF ADMINISTERED DRUGS (ALT 637 FOR MEDICARE OP): Performed by: STUDENT IN AN ORGANIZED HEALTH CARE EDUCATION/TRAINING PROGRAM

## 2023-10-01 PROCEDURE — 2500000004 HC RX 250 GENERAL PHARMACY W/ HCPCS (ALT 636 FOR OP/ED): Performed by: INTERNAL MEDICINE

## 2023-10-01 PROCEDURE — 71250 CT THORAX DX C-: CPT | Mod: MG

## 2023-10-01 PROCEDURE — 96366 THER/PROPH/DIAG IV INF ADDON: CPT

## 2023-10-01 PROCEDURE — 36415 COLL VENOUS BLD VENIPUNCTURE: CPT | Performed by: OBSTETRICS & GYNECOLOGY

## 2023-10-01 PROCEDURE — 2500000001 HC RX 250 WO HCPCS SELF ADMINISTERED DRUGS (ALT 637 FOR MEDICARE OP): Performed by: OBSTETRICS & GYNECOLOGY

## 2023-10-01 PROCEDURE — 96375 TX/PRO/DX INJ NEW DRUG ADDON: CPT | Mod: XU

## 2023-10-01 PROCEDURE — 87040 BLOOD CULTURE FOR BACTERIA: CPT

## 2023-10-01 PROCEDURE — 82947 ASSAY GLUCOSE BLOOD QUANT: CPT

## 2023-10-01 PROCEDURE — 85027 COMPLETE CBC AUTOMATED: CPT | Performed by: OBSTETRICS & GYNECOLOGY

## 2023-10-01 PROCEDURE — 9990 CHARGE CONVERSION

## 2023-10-01 PROCEDURE — G1004 CDSM NDSC: HCPCS

## 2023-10-01 PROCEDURE — 99285 EMERGENCY DEPT VISIT HI MDM: CPT | Mod: 25

## 2023-10-01 PROCEDURE — 80048 BASIC METABOLIC PNL TOTAL CA: CPT | Performed by: OBSTETRICS & GYNECOLOGY

## 2023-10-01 PROCEDURE — 96365 THER/PROPH/DIAG IV INF INIT: CPT | Mod: XU

## 2023-10-01 RX ORDER — AMOXICILLIN AND CLAVULANATE POTASSIUM 875; 125 MG/1; MG/1
1 TABLET, FILM COATED ORAL 2 TIMES DAILY
Qty: 14 TABLET | Refills: 0 | Status: SHIPPED | OUTPATIENT
Start: 2023-10-01 | End: 2023-10-08

## 2023-10-01 RX ORDER — ACETAMINOPHEN 325 MG/1
650 TABLET ORAL EVERY 6 HOURS PRN
Qty: 30 TABLET | Refills: 0 | Status: SHIPPED | OUTPATIENT
Start: 2023-10-01 | End: 2024-05-22 | Stop reason: HOSPADM

## 2023-10-01 RX ORDER — IBUPROFEN 600 MG/1
600 TABLET ORAL EVERY 6 HOURS PRN
Qty: 30 TABLET | Refills: 0 | Status: SHIPPED | OUTPATIENT
Start: 2023-10-01 | End: 2024-05-22 | Stop reason: HOSPADM

## 2023-10-01 RX ORDER — FERROUS SULFATE 325(65) MG
325 TABLET, DELAYED RELEASE (ENTERIC COATED) ORAL
Qty: 60 TABLET | Refills: 0 | Status: SHIPPED | OUTPATIENT
Start: 2023-10-01 | End: 2024-05-02 | Stop reason: WASHOUT

## 2023-10-01 RX ORDER — OXYCODONE AND ACETAMINOPHEN 5; 325 MG/1; MG/1
1 TABLET ORAL EVERY 6 HOURS PRN
Status: DISCONTINUED | OUTPATIENT
Start: 2023-10-01 | End: 2023-10-01 | Stop reason: HOSPADM

## 2023-10-01 RX ADMIN — IBUPROFEN 600 MG: 600 TABLET ORAL at 18:38

## 2023-10-01 RX ADMIN — PIPERACILLIN SODIUM AND TAZOBACTAM SODIUM 4.5 G: 4; .5 INJECTION, SOLUTION INTRAVENOUS at 06:39

## 2023-10-01 RX ADMIN — IBUPROFEN 600 MG: 600 TABLET ORAL at 00:02

## 2023-10-01 RX ADMIN — OXYCODONE AND ACETAMINOPHEN 1 TABLET: 5; 325 TABLET ORAL at 20:01

## 2023-10-01 ASSESSMENT — COGNITIVE AND FUNCTIONAL STATUS - GENERAL
MOBILITY SCORE: 24
DAILY ACTIVITIY SCORE: 24

## 2023-10-01 ASSESSMENT — PAIN SCALES - GENERAL
PAINLEVEL_OUTOF10: 4
PAINLEVEL_OUTOF10: 0 - NO PAIN
PAINLEVEL_OUTOF10: 0 - NO PAIN
PAINLEVEL_OUTOF10: 4
PAINLEVEL_OUTOF10: 0 - NO PAIN

## 2023-10-01 ASSESSMENT — PAIN - FUNCTIONAL ASSESSMENT
PAIN_FUNCTIONAL_ASSESSMENT: 0-10
PAIN_FUNCTIONAL_ASSESSMENT: 0-10

## 2023-10-01 NOTE — NURSING NOTE
Temperature 37.3 at this time. Pt states she feels as if temp is breaking. Concerned that she keeps getting increased temperatures because she really wants to go home tomorrow.

## 2023-10-01 NOTE — NURSING NOTE
Dr. Jonas notified of increased temp 39.5 C and pts request for Motrin over Tylenol.  She said she would add Motrin

## 2023-10-01 NOTE — PROGRESS NOTES
"Daria Isaac is a 32 y.o. female on day 3 of admission presenting with Fever of unknown origin.    Subjective   Interval History:   Interval temperature spike  Resolved abdominal pain  Minimal spotting  No dysuria, frequency, urgency  No nausea vomiting or diarrhea    Objective   Physical Exam  General: no acute distress  Psych: awake, alert  Head: Anicteric with no conjunctival injection  ENT: Midline nasal septum with no mucosal lesions  Neck: Supple with no obvious masses  Chest: Clear to auscultation bilaterally with no rales or rhonchi  Cardiovascular system: Regular rate and rhythm with no rubs murmurs or gallops  Abdomen: Soft, nontender, bowel sounds normoactive  Extremities: No edema with no cyanosis or clubbing  Skin: No rash, nodules  Musculoskeletal system no joint swelling or tenderness    Range of Vitals (last 24 hours)  Heart Rate:  [73-93]   Temp:  [36.6 °C (97.9 °F)-37.1 °C (98.8 °F)]   Resp:  [18]   BP: (133-143)/(76-90)   SpO2:  [99 %-100 %]   Daily Weight  09/28/23 : 105 kg (231 lb 7.7 oz)    Body mass index is 32.29 kg/m².    Relevant Results  Labs:  Results from last 72 hours   Lab Units 09/28/23  0806 09/27/23  1653   WBC AUTO K/UL 13.0* 10.0   HEMOGLOBIN GM/DL 7.5* 8.0*   HEMATOCRIT % 24.8* 25.4*   PLATELETS AUTO K/* 474*   LYMPHO PCT MAN %  --  8.00*   MONO PCT MAN %  --  11.00*     Results from last 72 hours   Lab Units 09/28/23  0806   SODIUM MMOL/L 135   POTASSIUM MMOL/L 3.9   CHLORIDE MMOL/L 102   CO2 MMOL/L 18*   BUN MG/DL 8   CREATININE MG/DL 0.7   GLUCOSE MG/DL 96   CALCIUM MG/DL 8.4*   ANION GAP MMOL/L 15   ESTIMATED GFR mL/min/1.73 m2 119     Results from last 72 hours   Lab Units 09/27/23  1653   ALK PHOS U/L 93   BILIRUBIN TOTAL MG/DL 0.7   PROTEIN TOTAL G/DL 8.6*   ALT U/L 37   AST U/L 29   ALBUMIN GM/DL 3.4*     Estimated Creatinine Clearance: 125 mL/min (by C-G formula based on SCr of 0.7 mg/dL).  No results found for: \"CRP\"  No results found for the last 7 " days.    Imaging:  CT abdomen pelvis w IV contrast    Result Date: 9/27/2023  PROCEDURE:         ABD PELV W ORAL AND IV CONTRAST - WCT  3205 REASON FOR EXAM: Abdominal pain, acute, nonlocalized RESULT: MRN: [id number] Patient Name: CLAUDIA MUNOZ STUDY: ABD PELV W ORAL AND IV CONTRAST; 9/27/2023 8:39 pm INDICATION: Right-sided pain with history of ovarian cyst COMPARISON: 08/30/2020. ACCESSION NUMBER(S): [id number] ORDERING CLINICIAN: ZAHEER OSEI TECHNIQUE: Oral contrast in the form of water was administered. 75 ml Omnipaque 350 was injected intravenously. CT of the Abdomen and Pelvis without and with intravenous contrast was performed. Axial, sagittal and coronal reformatted images were reviewed. FINDINGS: Lower Chest: within normal limits. Abdomen: Liver: within normal limits. Bile Ducts: Normal caliber. Gallbladder: No calcified gallstones. Normal caliber wall. Pancreas: within normal limits. Spleen: within normal limits. Adrenals: within normal limits. Kidneys: within normal limits. There is no obstructive uropathy on either side. Pelvis: Reproductive Organs: There is a 9.9 x 10.1 x 11.2 cm right ovarian cyst containing solitary septation with the cyst wall appearing rather thickened. There is a 3.6 x 3.9 x 3.6 cm left ovarian cyst seen.  The uterus is enlarged with a minimally hyperdense mass seen centrally measuring 6 cm in size. Ureters: within normal limits. Bladder: within normal limits. Bowel: Normal caliber. There is no abnormal bowel wall thickening. No appendicitis is seen. Mesenteric Lymph Nodes: No enlarged mesenteric lymph nodes. Peritoneum: No ascites or free air, no fluid collection. Vessels:  within normal limits. Retroperitoneum: within normal limits. Abdominal Wall: within normal limits. Bones: within normal limits. IMPRESSION: Bilateral ovarian cysts, right larger than left with that on the right containing septation. Differential diagnosis would include bilateral endometriomas, chocolate  cysts, or even ovarian neoplasms. No ascites or pleural effusion observed. Enlarged uterus containing a 6 cm minimally hyperdense mass centrally. This could represent large submucosal leiomyoma or endometrial abnormality with endometrial carcinoma not excluded. MACRO: None. Dictation workstation:   [id number] This exam is available in DICOM format to non-affiliated healthcare facilities on a secure media free searchable basis with prior patient authorization.  The patient exposure is reported to a radiation dose index registry.  All CT examinations are performed with one or more of the following dose reduction techniques: Automated Exposure Control, Adjustment of mA and/or KV according to patient size, or use of iterative reconstruction techniques. Original Interpreting Physician:   MARIO NIEVES M.D. Original Transcribed by/Date: MMODAL Sep 27 2023  5:15P Original Electronically Signed by/Date: MARIO NIEVES M.D. Sep 27 2023 10:08P Addendum Interpreting Physician: Addendum Transcribed by/Date: NO ADDENDUM Addendum Electronically Signed by/Date:     US pelvis    Result Date: 9/27/2023  PROCEDURE:         PELVIS NON OB COMPLETE - WUS  2502 REASON FOR EXAM: abd pain RESULT: MRN: [id number] Patient Name: CLAUDIA MUNOZ STUDY: PELVIS NON OB COMPLETE; 9/27/2023 8:10 pm INDICATION: Right-sided pain COMPARISON: None. ACCESSION NUMBER(S): [id number] ORDERING CLINICIAN: ZAHEER OSEI TECHNIQUE: Grayscale and color Doppler imaging of the pelvis were performed. Transabdominal technique was utilized with patient refusing transvaginal study. FINDINGS: Uterus: Size: 11.6 x 8.8 x 7.3 cm. There is heterogeneous mass seen centrally within the enlarged uterus measuring 5.4 x 6.1 x 6.1 cm in size. A normal endometrium is not seen. Ovaries: There is normal color-flow with no sign of ovarian torsion. Right ovary: 11.8 x 10.4 x 11.0 cm. There is an 8.9 x 9.2 x 10.2 cm complex right ovarian cyst containing low-level internal echoes and  exhibiting enhanced through transmission. Right ovary volume: 709.4 ml Left ovary: 5.9 x 4.5 x 6.1 cm. There is a 3.4 x 3.6 x 2.6 cm left ovarian cyst seen. Left ovary volume: 84.4 ml Duplex Doppler interrogation of each ovary including color flow and spectral waveform analysis shows normal arterial and venous flow without ovarian torsion. There is no free fluid in the pelvis. IMPRESSION: Enlarged uterus with a 5.4 x 6.1 x 6.1 cm heterogeneous mass seen centrally within the uterus. This may represent a large submucosal leiomyoma or could represent markedly thickened endometrium with endometrial carcinoma not excluded. 8.9 x 9.2 x 10.2 cm complex right ovarian cyst. I suspect that this most likely represents very large chocolate cyst or endometrioma. 2.6 x 3.4 x 3.6 cm simple left ovarian cyst. No ovarian torsion. MACRO: None. Dictation workstation:   [id number] Original Interpreting Physician:   MARIO NIEVES M.D. Original Transcribed by/Date: MMODAL Sep 27 2023  5:15P Original Electronically Signed by/Date: MARIO NIEVES M.D. Sep 27 2023  9:57P Addendum Interpreting Physician: Addendum Transcribed by/Date: NO ADDENDUM Addendum Electronically Signed by/Date:     1) SIRS,   Clinical Status Improving,   Details rule out sepsis,   Plan IV antibiotics, follow-up blood cultures ;     2) Fever,   Details Unknown etiology, rule out UTI,   10/1-interval temperature spike  Plan CT chest abdomen pelvis with oral contrast    3) Pyuria,   Details versus UTI,   Plan IV Zosyn, follow culture;     4) Computed Tomography Result Abnormal,   Details CT abd/pelvis showed bilateral ovarian cysts, right larger than left, enlarged uterus,   Plan GYN consultation;     5) AP,   IV Zosyn  CT chest abdomen and pelvis with oral contrast  Monitor WBC and temperature  Monitor renal function  GYN follow-up  Plan is to de-escalate to Augmentin, total of 7 days      Phillip Scanlon MD

## 2023-10-01 NOTE — PROGRESS NOTES
Patient transferred from Hospitalist service ([facility]) at [facility] to Obgyn service at [facility] serveral days ago.  Will sign off and correct registration.              Jef Taylor MD

## 2023-10-01 NOTE — PROGRESS NOTES
"Daria Isaac is a 32 y.o. female on day 4 of admission presenting with Fever of unknown origin.    Subjective   Pt feeling much better. Abdominal discomfort resolved this am. She reports minimal vaginal spotting. She did have fever last night. She is urinating, ambulating, tolerating PO.           Objective     Physical Exam  Constitutional:       Appearance: Normal appearance.   HENT:      Head: Normocephalic and atraumatic.   Cardiovascular:      Rate and Rhythm: Normal rate and regular rhythm.   Pulmonary:      Effort: Pulmonary effort is normal.      Breath sounds: Normal breath sounds.   Abdominal:      Palpations: Abdomen is soft. There is mass.      Tenderness: There is no abdominal tenderness.   Genitourinary:     Vagina: Normal.      Cervix: Dilated.      Uterus: Normal.    Neurological:      Mental Status: She is alert.         Last Recorded Vitals  Blood pressure 141/82, pulse 70, temperature 37 °C (98.6 °F), temperature source Oral, resp. rate 18, height 1.803 m (5' 11\"), weight 105 kg, SpO2 100 %.  Intake/Output last 3 Shifts:  I/O last 3 completed shifts:  In: 1040 (9.9 mL/kg) [P.O.:640; IV Piggyback:400]  Out: 751 (7.2 mL/kg) [Urine:750 (0.2 mL/kg/hr); Stool:1]  Weight: 105 kg     Relevant Results           This patient currently has cardiac telemetry ordered; if you would like to modify or discontinue the telemetry order, click here to go to the orders activity to modify/discontinue the order.    Current medications:    iron sucrose (Venofer) 200 mg in sodium chloride 0.9% 100 mL IV, 200 mg, intravenous, Once  piperacillin-tazobactam, 4.5 g, intravenous, q8h             Results for orders placed or performed during the hospital encounter of 09/27/23 (from the past 24 hour(s))   CBC and Auto Differential   Result Value Ref Range    WBC 11.4 (H) 4.4 - 11.3 x10*3/uL    nRBC 0.0 0.0 - 0.0 /100 WBCs    RBC 3.41 (L) 4.00 - 5.20 x10*6/uL    Hemoglobin 7.1 (L) 12.0 - 16.0 g/dL    Hematocrit 23.6 (L) 36.0 " - 46.0 %    MCV 69 (L) 80 - 100 fL    MCH 20.8 (L) 26.0 - 34.0 pg    MCHC 30.1 (L) 32.0 - 36.0 g/dL    RDW 17.0 (H) 11.5 - 14.5 %    Platelets 577 (H) 150 - 450 x10*3/uL    MPV 10.3 7.5 - 11.5 fL    Neutrophils % 78.6 40.0 - 80.0 %    Immature Granulocytes %, Automated 1.1 (H) 0.0 - 0.9 %    Lymphocytes % 7.7 13.0 - 44.0 %    Monocytes % 11.2 2.0 - 10.0 %    Eosinophils % 1.2 0.0 - 6.0 %    Basophils % 0.2 0.0 - 2.0 %    Neutrophils Absolute 8.93 (H) 1.20 - 7.70 x10*3/uL    Immature Granulocytes Absolute, Automated 0.12 0.00 - 0.70 x10*3/uL    Lymphocytes Absolute 0.88 (L) 1.20 - 4.80 x10*3/uL    Monocytes Absolute 1.27 (H) 0.10 - 1.00 x10*3/uL    Eosinophils Absolute 0.14 0.00 - 0.70 x10*3/uL    Basophils Absolute 0.02 0.00 - 0.10 x10*3/uL   Basic Metabolic Panel   Result Value Ref Range    Glucose 112 (H) 65 - 99 mg/dL    Sodium 132 (L) 133 - 145 mmol/L    Potassium 3.5 3.4 - 5.1 mmol/L    Chloride 96 (L) 97 - 107 mmol/L    Bicarbonate 25 24 - 31 mmol/L    Urea Nitrogen 5 (L) 8 - 25 mg/dL    Creatinine 0.60 0.40 - 1.60 mg/dL    eGFR >90 >60 mL/min/1.73m*2    Calcium 8.6 8.5 - 10.4 mg/dL    Anion Gap 11 <=19 mmol/L   Basic metabolic panel   Result Value Ref Range    Glucose 113 (H) 65 - 99 mg/dL    Sodium 136 133 - 145 mmol/L    Potassium 3.4 3.4 - 5.1 mmol/L    Chloride 100 97 - 107 mmol/L    Bicarbonate 26 24 - 31 mmol/L    Urea Nitrogen 4 (L) 8 - 25 mg/dL    Creatinine 0.60 0.40 - 1.60 mg/dL    eGFR >90 >60 mL/min/1.73m*2    Calcium 8.5 8.5 - 10.4 mg/dL    Anion Gap 10 <=19 mmol/L   CBC   Result Value Ref Range    WBC 10.9 4.4 - 11.3 x10*3/uL    nRBC 0.0 0.0 - 0.0 /100 WBCs    RBC 3.33 (L) 4.00 - 5.20 x10*6/uL    Hemoglobin 7.0 (L) 12.0 - 16.0 g/dL    Hematocrit 22.9 (L) 36.0 - 46.0 %    MCV 69 (L) 80 - 100 fL    MCH 21.0 (L) 26.0 - 34.0 pg    MCHC 30.6 (L) 32.0 - 36.0 g/dL    RDW 16.7 (H) 11.5 - 14.5 %    Platelets 637 (H) 150 - 450 x10*3/uL    MPV 9.5 7.5 - 11.5 fL          Assessment/Plan   Principal  Problem:    Fever of unknown origin  Active Problems:    Uterine fibroid    Cyst of right ovary    Anemia    Fever of unknown origin  -last fever at  11 pm 9/30  -Pt on Zosyn and following with infectious disease, Plan for repeat CT today and, will await further recommendations  -following urine and blood cultures  -am CBC stable     Fibroid uterus  -Bleeding stable  -Follows with outpatient gyn     Ovarian cyst  -Stable since July 23  -Follows with outpatient gyn     Anemia  -Likely related to abnormal uterine bleeding  -Stable, and pt reports minimal vaginal bleeding currently  -Will do IV iron infusion today          I spent 30 minutes in the professional and overall care of this patient.      Radha Milligan MD

## 2023-10-01 NOTE — DISCHARGE SUMMARY
Discharge Diagnosis  Fever of unknown origin    Issues Requiring Follow-Up    Pt to follow up with primary ObGyn for surgical management of ovarian cysts and fibroids as planned prior.     Test Results Pending At Discharge  Pending Labs       No current pending labs.              Hospital Course   Pt seen at ED, for fever and pain on 9/27. Found to have uterine fibroids, ovarian cysts, but further noted that these were persistent several months, and followed by primary gyn, and not and emergent finding. However on arrival pt noted to have fever and elevated white count to 13 and admitted for infection risks. Transferred to gyn after admission, at [facility]. Followed by infectious disease throughout her stay. Was treated with IV zosyn. Fever resolved, and WBC resolved to 10. Pt symptoms resolved by 9/30. She did have one more fever as her symptoms were resolving last night which prompted repeat CT scan, which appears stable. On CT, there were mild interval changes to cyst, but on discussion with ID, low concern for infectious ovarian concern based on these images. Fever may be related to ovarian/uterine pathology, benign vs. Malignant in nature, but as there is no acute process, and pt recovering, with normal activities of daily living, she is stable for discharge. Pt aware of these concerns, and plans on follow up with primary gyn this week for surgical planning. She will also continue PO Augmentin 7 days per ID recommendations.     Pertinent Physical Exam At Time of Discharge  Physical Exam  Cardiovascular:      Rate and Rhythm: Normal rate and regular rhythm.   Pulmonary:      Effort: Pulmonary effort is normal.      Breath sounds: Normal breath sounds.   Abdominal:      Palpations: Abdomen is soft.      Tenderness: There is no abdominal tenderness.      Comments: Pelvic mass   Genitourinary:     Vagina: Normal.      Cervix: Dilated.      Uterus: Normal.          Home Medications     Medication List       START taking these medications     acetaminophen 325 mg tablet; Commonly known as: Tylenol; Take 2 tablets   (650 mg) by mouth every 6 hours if needed for mild pain (1 - 3).   ferrous sulfate 325 (65 Fe) MG EC tablet; Take 1 tablet (325 mg) by   mouth 3 times a day with meals. Do not crush, chew, or split.   ibuprofen 600 mg tablet; Take 1 tablet (600 mg) by mouth every 6 hours   if needed for fever (temp greater than 38.0 C).       Outpatient Follow-Up  No future appointments.    Pt will call gyn to schedule follow up tomorrow    Radha Milligan MD

## 2023-10-02 ENCOUNTER — TELEPHONE (OUTPATIENT)
Dept: OBSTETRICS AND GYNECOLOGY | Facility: HOSPITAL | Age: 33
End: 2023-10-02

## 2023-10-02 NOTE — NURSING NOTE
Dr. Milligan in to speak with pt and pt discharged home.    Written prescriptions given to pt. Copies in chart. Pt verbalized her understanding with filling prescriptions and following up with physician. She also understands when to call physician if she starts to have issues.    Pt escorted to ed for her ride home.

## 2023-10-02 NOTE — TELEPHONE ENCOUNTER
Patient calling in with questions about surgery. Saw Dr. Mcadams on 9/12 currently admitted to hospital and had US done. Patient was supposed to have follow up pelvic US to determine if cyst resolved. Patient admitted to hospital for something else and had US. US showed cyst still there. Let patient know per Dr. Mcadams's note she is referring her to [facility]. Patient given number for Dr. Park's office to schedule. Patient verbalized understanding and all questions and concerns addressed.

## 2023-10-03 ENCOUNTER — TELEPHONE (OUTPATIENT)
Dept: OBSTETRICS AND GYNECOLOGY | Facility: CLINIC | Age: 33
End: 2023-10-03

## 2023-10-03 ENCOUNTER — TELEPHONE (OUTPATIENT)
Dept: OBSTETRICS AND GYNECOLOGY | Facility: HOSPITAL | Age: 33
End: 2023-10-03
Payer: COMMERCIAL

## 2023-10-03 NOTE — TELEPHONE ENCOUNTER
Patient calling back about [facility] referral. Confirmed phone number given to patient was correct. Let patient know to call and ask to be scheduled with Dr. Park as that is who she is being referred to. Patient verbalized understanding and all questions and concerns addressed.

## 2023-10-04 PROCEDURE — 87591 N.GONORRHOEAE DNA AMP PROB: CPT

## 2023-10-04 PROCEDURE — 9990 CHARGE CONVERSION

## 2023-10-04 PROCEDURE — 87491 CHLMYD TRACH DNA AMP PROBE: CPT

## 2023-11-25 NOTE — PROGRESS NOTES
Division of Minimally Invasive Gynecologic Surgery  [facility]    23 Gynecology Consult     HISTORY OF PRESENT ILLNESS:  Daria Isaac 33 y.o.  referred by Dr. Mcadams for suspicion of endometriosis and fibroid uterus noted on imaging.     She has a history of worsening dysmenorrhea for at least the last year. Her menses have been irregular, with unpredictable bleeding throughout the month. The first 2 days of her cycle, pain is so debilitating that she has to call off of work.  OTC meds and IcyHot have note helped.     She also has developed daily pain that she suspects is related to the cyst, including right sided pain and back pain.     She started norethindrone 5mg daily almost 2 months ago, but has still had a heavy cycle of normal length recently.     She does have a history of fibroids that have been noted on imaging before per patient.     Of note, she had a recent admission in early October for fever and leukocytosis (WBC 13), suspected to be GYN in origin. Responded well to Zosyn.     OBHx: SAB x 3   Imaging: Pelvic US 2023 showed uterus measuring 12cm x 8cm x 7cm. 5cm x 6cm x6cm central heterogenous mass, likely representing submucosal fibroid. 9cm x 9cm x 10cm R ovarian cyst, favor endometrioma.   Labs:   - HIV, Hep C/B, Syphilis, gonorrhea, trichomonas negative 2023, chlamydia testing inadequate sample    - CBC 10/2023 w/ Hgb 7.0, platelets 637  - Recent CMP WNL   - Recent A1C and TSH WNL   Screening:   - Last pap 2023 negative/negative, no hx of CIN2-3  - Last mammogram NA  PMHx: Recurrent pregnancy loss, uterine fibroids, obesity, anemia   PSHx: None     PAST MEDICAL HISTORY:  Past Medical History:   Diagnosis Date    Anemia     Fibroid uterus     Recurrent pregnancy loss      PAST SURGICAL HISTORY:  Past Surgical History:   Procedure Laterality Date    NO PAST SURGERIES       PHYSICAL EXAMINATION:  VITAL SIGNS: BP (!) 161/119   Pulse 84   Ht  "1.803 m (5' 11\")   Wt 111 kg (244 lb 9.6 oz)   LMP 2023 (Exact Date) Comment: Abnormal  BMI 34.11 kg/m²     Constitutional:  No acute distress, well-nourished and well-developed  HEENT: EOM grossly intact, MMM, neck supple and with full ROM  Pulm:  Effort normal. No accessory muscle usage.  No respiratory distress.  Neurological:  She is alert and oriented to person place and time.  Skin: Warm, no pallor.  Psychiatric:  She has normal mood and affect.    ASSESSMENT:  Daria Isaac 33 y.o.  referred by Dr. Mcadams for suspicion of endometriosis and fibroid uterus noted on imaging.     #?Endometriosis/Fibroids   - We discussed today the multiple etiologies of chronic pelvic pain, including endometriosis, adenomyosis, GI etiologies, MSK etiologies, and centralization of pain. I am primarily suspicious of endometriosis in her case. We discussed the role of surgical excision in the management of endometriosis and the estimated 15-20% recurrence rate in the future. We also reviewed the association between endometriosis and adenomyosis, as well as the fact that definitive diagnosis of adenomyosis cannot be made without hysterectomy.   - We discussed the natural history of endometriosis and the fact that hormonal suppression is thought to have a role in slowing disease progression and managing symptoms of endometriosis, but cannot definitively reverse or eliminate endometriosis.   - With her large ovarian cyst that likely represents endometrioma, I do think surgical excision is appropriate for this patient, and she desires to proceed with an aggressive surgical approach.   - We discussed the etiology and natural history of fibroids today, including the fact that their growth is estrogen-dependent and fibroids typically decrease in size after menopause. We reviewed treatment options, including medical suppression of bleeding and surgical interventions, including UAE, myomectomy, and hysterectomy.   - I " recommend preoperative MRI to evaluate for the presence of deep infiltrating endometriosis, with particular attention to bowel endometriosis.  - After imaging is complete, we will have a follow up conversation to discuss mode of myomectomy (if submucosal, we will plan for hysteroscopic vs robotic if majority of fibroid is intramural).   - Flexeril PRN for pain w/ menses     #HTN  - She reports frequently elevated Bps at prior clinic visits, but no diagnosis of HTN.   - She is currently asymptomatic  - Recommended repeat BP check today after leaving clinic, reviewed concerning Bps  - Also recommend home checks x 2 weeks     #RPL   - S/p normal APLS labs, A1C, TSH     #Anemia  - Increase norethindrone to 10mg to increase menstrual suppression  - Currently on PO iron but w/ some GI distress w/ BID use, will move to every other day BID and add Miralax     PLAN:  - MRI, RTC after to further discuss surgical planning  - Increase NE to 10mg daily  - PO Iron BID every other day, Miralax for constipation     Scarlett Park MD  11/24/23  7:21 PM

## 2023-11-28 ENCOUNTER — OFFICE VISIT (OUTPATIENT)
Dept: OBSTETRICS AND GYNECOLOGY | Facility: CLINIC | Age: 33
End: 2023-11-28

## 2023-11-28 VITALS
HEART RATE: 90 BPM | WEIGHT: 244.6 LBS | BODY MASS INDEX: 34.24 KG/M2 | SYSTOLIC BLOOD PRESSURE: 176 MMHG | DIASTOLIC BLOOD PRESSURE: 126 MMHG | HEIGHT: 71 IN

## 2023-11-28 DIAGNOSIS — R10.2 PELVIC PAIN: ICD-10-CM

## 2023-11-28 DIAGNOSIS — N80.129 ENDOMETRIOMA: ICD-10-CM

## 2023-11-28 DIAGNOSIS — D25.0 INTRAMURAL AND SUBMUCOUS LEIOMYOMA OF UTERUS: Primary | ICD-10-CM

## 2023-11-28 DIAGNOSIS — D64.9 ANEMIA, UNSPECIFIED TYPE: ICD-10-CM

## 2023-11-28 DIAGNOSIS — N80.9 ENDOMETRIOSIS: ICD-10-CM

## 2023-11-28 DIAGNOSIS — I15.9 SECONDARY HYPERTENSION: ICD-10-CM

## 2023-11-28 DIAGNOSIS — D25.1 INTRAMURAL AND SUBMUCOUS LEIOMYOMA OF UTERUS: Primary | ICD-10-CM

## 2023-11-28 DIAGNOSIS — D21.9 FIBROIDS: ICD-10-CM

## 2023-11-28 DIAGNOSIS — N93.9 ABNORMAL UTERINE BLEEDING (AUB): ICD-10-CM

## 2023-11-28 PROCEDURE — 3077F SYST BP >= 140 MM HG: CPT | Performed by: STUDENT IN AN ORGANIZED HEALTH CARE EDUCATION/TRAINING PROGRAM

## 2023-11-28 PROCEDURE — 1036F TOBACCO NON-USER: CPT | Performed by: STUDENT IN AN ORGANIZED HEALTH CARE EDUCATION/TRAINING PROGRAM

## 2023-11-28 PROCEDURE — 99215 OFFICE O/P EST HI 40 MIN: CPT | Performed by: STUDENT IN AN ORGANIZED HEALTH CARE EDUCATION/TRAINING PROGRAM

## 2023-11-28 PROCEDURE — 99205 OFFICE O/P NEW HI 60 MIN: CPT | Performed by: STUDENT IN AN ORGANIZED HEALTH CARE EDUCATION/TRAINING PROGRAM

## 2023-11-28 PROCEDURE — 3080F DIAST BP >= 90 MM HG: CPT | Performed by: STUDENT IN AN ORGANIZED HEALTH CARE EDUCATION/TRAINING PROGRAM

## 2023-11-28 RX ORDER — POLYETHYLENE GLYCOL 3350 17 G/17G
17 POWDER, FOR SOLUTION ORAL DAILY
Qty: 90 PACKET | Refills: 3 | Status: SHIPPED | OUTPATIENT
Start: 2023-11-28 | End: 2024-02-26

## 2023-11-28 RX ORDER — CYCLOBENZAPRINE HCL 5 MG
5 TABLET ORAL 3 TIMES DAILY
Qty: 30 TABLET | Refills: 3 | Status: SHIPPED | OUTPATIENT
Start: 2023-11-28 | End: 2023-12-08

## 2023-11-28 ASSESSMENT — PAIN SCALES - GENERAL: PAINLEVEL: 0-NO PAIN

## 2023-12-16 ENCOUNTER — APPOINTMENT (OUTPATIENT)
Dept: RADIOLOGY | Facility: HOSPITAL | Age: 33
End: 2023-12-16

## 2023-12-30 ENCOUNTER — APPOINTMENT (OUTPATIENT)
Dept: RADIOLOGY | Facility: HOSPITAL | Age: 33
End: 2023-12-30

## 2024-01-20 ENCOUNTER — HOSPITAL ENCOUNTER (OUTPATIENT)
Dept: RADIOLOGY | Facility: HOSPITAL | Age: 34
Discharge: HOME | End: 2024-01-20
Payer: COMMERCIAL

## 2024-01-20 DIAGNOSIS — D25.1 INTRAMURAL AND SUBMUCOUS LEIOMYOMA OF UTERUS: ICD-10-CM

## 2024-01-20 DIAGNOSIS — D25.0 INTRAMURAL AND SUBMUCOUS LEIOMYOMA OF UTERUS: ICD-10-CM

## 2024-01-20 PROCEDURE — 72197 MRI PELVIS W/O & W/DYE: CPT

## 2024-01-20 PROCEDURE — A9575 INJ GADOTERATE MEGLUMI 0.1ML: HCPCS | Performed by: STUDENT IN AN ORGANIZED HEALTH CARE EDUCATION/TRAINING PROGRAM

## 2024-01-20 PROCEDURE — 2550000001 HC RX 255 CONTRASTS: Performed by: STUDENT IN AN ORGANIZED HEALTH CARE EDUCATION/TRAINING PROGRAM

## 2024-01-20 RX ORDER — GADOTERATE MEGLUMINE 376.9 MG/ML
20 INJECTION INTRAVENOUS
Status: COMPLETED | OUTPATIENT
Start: 2024-01-20 | End: 2024-01-20

## 2024-01-20 RX ADMIN — GADOTERATE MEGLUMINE 20 ML: 376.9 INJECTION INTRAVENOUS at 13:18

## 2024-02-14 ENCOUNTER — PREP FOR PROCEDURE (OUTPATIENT)
Dept: OBSTETRICS AND GYNECOLOGY | Facility: HOSPITAL | Age: 34
End: 2024-02-14

## 2024-02-14 ENCOUNTER — OFFICE VISIT (OUTPATIENT)
Dept: OBSTETRICS AND GYNECOLOGY | Facility: CLINIC | Age: 34
End: 2024-02-14
Payer: COMMERCIAL

## 2024-02-14 VITALS — WEIGHT: 245 LBS | BODY MASS INDEX: 34.17 KG/M2

## 2024-02-14 DIAGNOSIS — D21.9 FIBROID: Primary | ICD-10-CM

## 2024-02-14 DIAGNOSIS — D21.9 FIBROID: ICD-10-CM

## 2024-02-14 DIAGNOSIS — D50.0 IRON DEFICIENCY ANEMIA DUE TO CHRONIC BLOOD LOSS: ICD-10-CM

## 2024-02-14 DIAGNOSIS — N83.209 CYST OF OVARY, UNSPECIFIED LATERALITY: Primary | ICD-10-CM

## 2024-02-14 DIAGNOSIS — N83.209 CYST OF OVARY, UNSPECIFIED LATERALITY: ICD-10-CM

## 2024-02-14 DIAGNOSIS — N94.6 DYSMENORRHEA: ICD-10-CM

## 2024-02-14 DIAGNOSIS — N80.9 ENDOMETRIOSIS: ICD-10-CM

## 2024-02-14 PROCEDURE — 99215 OFFICE O/P EST HI 40 MIN: CPT | Performed by: STUDENT IN AN ORGANIZED HEALTH CARE EDUCATION/TRAINING PROGRAM

## 2024-02-14 PROCEDURE — 1036F TOBACCO NON-USER: CPT | Performed by: STUDENT IN AN ORGANIZED HEALTH CARE EDUCATION/TRAINING PROGRAM

## 2024-02-14 RX ORDER — MISOPROSTOL 100 UG/1
400 TABLET ORAL ONCE
Status: CANCELLED | OUTPATIENT
Start: 2024-02-14 | End: 2024-02-14

## 2024-02-14 RX ORDER — GABAPENTIN 600 MG/1
600 TABLET ORAL ONCE
Status: CANCELLED | OUTPATIENT
Start: 2024-02-14 | End: 2024-02-14

## 2024-02-14 RX ORDER — CELECOXIB 50 MG/1
400 CAPSULE ORAL ONCE
Status: CANCELLED | OUTPATIENT
Start: 2024-02-14 | End: 2024-02-14

## 2024-02-14 RX ORDER — ACETAMINOPHEN 325 MG/1
975 TABLET ORAL ONCE
Status: CANCELLED | OUTPATIENT
Start: 2024-02-14 | End: 2024-02-14

## 2024-02-14 ASSESSMENT — PAIN SCALES - GENERAL: PAINLEVEL: 0-NO PAIN

## 2024-02-14 NOTE — PROGRESS NOTES
Division of Minimally Invasive Gynecologic Surgery  [facility]    24 Gynecology Visit    CC: No chief complaint on file.  AUB-L, dysmenorrhea     Daria Isaac is a 33 y.o.  who presents in follow up for suspected endometriosis and fibroid uterus. Currently managed on norethindrone 10mg.     Recent pelvic MRI showed 9cm anterior wall intramural fibroid. Also notable for suspected serous cystadenoma w/ 10cm smooth-walled fluid filled adnexal mass contiguous w/ ovary.     Labs:   - HIV, Hep C/B, Syphilis, gonorrhea, trichomonas negative 2023, chlamydia testing inadequate sample    - CBC 10/2023 w/ Hgb 7.0, platelets 637  - Recent CMP WNL   - Recent A1C and TSH WNL   Screening:   - Last pap 2023 negative/negative, no hx of CIN2-3  - Last mammogram NA  PMHx: Recurrent pregnancy loss, uterine fibroids, obesity, anemia   PSHx: None     PMHx, PSHx, SHx, Allergies, and Medications updated in Epic.    ROS: reviewed and negative    PE: Wt 111 kg (245 lb)   LMP 2024 (Approximate)   BMI 34.17 kg/m²    Constitutional:  No acute distress, well-nourished and well-developed  HEENT: EOM grossly intact, MMM, neck supple and with full ROM  Pulm:  Effort normal. No accessory muscle usage.  No respiratory distress.  Neurological:  She is alert and oriented to person place and time.  Skin: Warm, no pallor.  Psychiatric:  She has normal mood and affect.    A/P: Daria Isaac is a 33 y.o.  who presents in follow up for suspected endometriosis and fibroid uterus. Currently managed on norethindrone 10mg.   - We discussed the etiology and natural history of fibroids today, including the fact that their growth is estrogen-dependent and fibroids typically decrease in size after menopause. We reviewed treatment options, she is most interested in myomectomy.   - We also discussed the possible etiologies of the ovarian cyst, though I suspect serous cystadenoma. We discussed the  risks/benefits of cystectomy and she is comfortable proceeding w/ this.   - We discussed today the multiple etiologies of chronic pelvic pain, including endometriosis, adenomyosis, GI etiologies, MSK etiologies, and centralization of pain. She has multiple possible pain generators, but endometriosis is a reasonable consideration.   - We discussed the natural history of endometriosis and the fact that hormonal suppression is thought to have a role in slowing disease progression and managing symptoms of endometriosis, but cannot definitively reverse or eliminate endometriosis.   - I do think surgical excision is appropriate for this patient, and she desires to proceed with an aggressive surgical approach as we are already planning on surgery for the fibroid and ovarian cyst.  - We reviewed the risks/benefits/alternatives to surgery. She is aware of the risk of bleeding, infection, and damage to nearby structures, including reproductive structures, bladder, ureters, bowel, and vasculature. She is agreeable to blood transfusion if recommended. We reviewed the possibility of appendectomy, as well as the administration of antibiotics if appendectomy is performed. She understands and is in agreement with this plan.    Plan:  - Schedule for robotic myomectomy, ovarian cystectomy, excision of endometriosis, possible appendectomy, any indicated procedure. PAT: Yes. [facility].   - Tumor markers, LDH isoenzymes   - Desires to remain on birth control after surgery, will switch to Slynd from norethindrone. Coupon discussed.     Scarlett Park MD  Division of Minimally Invasive Gynecologic Surgery  [facility]

## 2024-02-15 ENCOUNTER — TELEPHONE (OUTPATIENT)
Dept: OBSTETRICS AND GYNECOLOGY | Facility: CLINIC | Age: 34
End: 2024-02-15
Payer: COMMERCIAL

## 2024-02-15 PROBLEM — N80.9 ENDOMETRIOSIS: Status: ACTIVE | Noted: 2024-02-14

## 2024-02-15 PROBLEM — D21.9 FIBROID: Status: ACTIVE | Noted: 2024-02-14

## 2024-02-15 PROBLEM — N83.209 OVARIAN RETENTION CYST: Status: ACTIVE | Noted: 2024-02-14

## 2024-02-15 NOTE — TELEPHONE ENCOUNTER
Patient scheduled for surgery:Robotic Myomectomy, ovarian cystectomy, excision of endometriosis    Date:4/27/24    Pre-Op visit:2/13/24    CPM:Yes    Labs/Testing:    Pt was provided with Getting ready for surgery Letter  [facility] approach brochure  Gynecologic Surgery Handout    All questions answered and pt verbalized understanding. She will call with any questions.  This is a reminder that the OR department will call you the day before your surgery to confirm your arrival time.     Please remember do not eat any food 8 hours prior to your surgery time. You may have small sips of water but do not drink 2 hrs before your arrival.    Please go to any  outpatient lab for any blood work if needed.  If you need any pre-admission testing, that department will contact you to set up an appointment.    Please call the office to schedule your post op follow up appointment.    Viji Alas RN

## 2024-03-29 ENCOUNTER — TELEPHONE (OUTPATIENT)
Dept: OBSTETRICS AND GYNECOLOGY | Facility: CLINIC | Age: 34
End: 2024-03-29
Payer: COMMERCIAL

## 2024-03-29 NOTE — TELEPHONE ENCOUNTER
Name/ verified  Pt states that she was informed today that her upcoming insurance has not been approved.    Pt spoke with Ro in precert department and this is what was discussed with patient:  Referral Source: Auth/Cert  Spoke with pt advised her that [employer] is OON with [facility].  Educated pt on what marketplace plans are INN with [facility]. The pt stated  that she'll research another marketplace plan to obtain then call back  once she has switched health plans. This pt is not financially cleared.   Pt verbalized her frustration with the short notice of just finding this out. She has already planned her leave of absence with her employer. Apologies provided and encouraged pt to call the other insurance options provided first thing Monday morning. Once she is accepted then to call Ro back to hopefully expedite the coverage process.  Pt verbalized understanding.

## 2024-04-02 ENCOUNTER — TELEPHONE (OUTPATIENT)
Dept: OBSTETRICS AND GYNECOLOGY | Facility: CLINIC | Age: 34
End: 2024-04-02
Payer: COMMERCIAL

## 2024-04-02 NOTE — TELEPHONE ENCOUNTER
Name/ verified  Patient had to cancel surgery for 24 d/t having to get new insurance. Her new plan starts 24. She is asking for first available date to reschedule. She is going on cruise -8/3. Message sent to [facility] surgery scheduler and Dr. Park. Pt made aware that [facility] will contact her with a date. Pt is going on cruise -8/3. Advised pt to keep PAT appointment 24 unless advised not to.  Pt verbalized understanding.

## 2024-04-04 ENCOUNTER — APPOINTMENT (OUTPATIENT)
Dept: PREADMISSION TESTING | Facility: HOSPITAL | Age: 34
End: 2024-04-04
Payer: COMMERCIAL

## 2024-04-11 ENCOUNTER — APPOINTMENT (OUTPATIENT)
Dept: PREADMISSION TESTING | Facility: HOSPITAL | Age: 34
End: 2024-04-11
Payer: COMMERCIAL

## 2024-05-02 ENCOUNTER — TELEMEDICINE CLINICAL SUPPORT (OUTPATIENT)
Dept: PREADMISSION TESTING | Facility: HOSPITAL | Age: 34
End: 2024-05-02
Payer: COMMERCIAL

## 2024-05-02 ASSESSMENT — DUKE ACTIVITY SCORE INDEX (DASI)
CAN YOU WALK INDOORS, SUCH AS AROUND YOUR HOUSE: YES
CAN YOU DO HEAVY WORK AROUND THE HOUSE LIKE SCRUBBING FLOORS OR LIFTING AND MOVING HEAVY FURNITURE: YES
CAN YOU TAKE CARE OF YOURSELF (EAT, DRESS, BATHE, OR USE TOILET): YES
CAN YOU CLIMB A FLIGHT OF STAIRS OR WALK UP A HILL: YES
CAN YOU DO YARD WORK LIKE RAKING LEAVES, WEEDING OR PUSHING A MOWER: YES
CAN YOU PARTICIPATE IN MODERATE RECREATIONAL ACTIVITIES LIKE GOLF, BOWLING, DANCING, DOUBLES TENNIS OR THROWING A BASEBALL OR FOOTBALL: YES
CAN YOU WALK A BLOCK OR TWO ON LEVEL GROUND: YES
CAN YOU DO MODERATE WORK AROUND THE HOUSE LIKE VACUUMING, SWEEPING FLOORS OR CARRYING GROCERIES: YES
DASI METS SCORE: 9.9
CAN YOU HAVE SEXUAL RELATIONS: YES
CAN YOU DO LIGHT WORK AROUND THE HOUSE LIKE DUSTING OR WASHING DISHES: YES
CAN YOU RUN A SHORT DISTANCE: YES
CAN YOU PARTICIPATE IN STRENOUS SPORTS LIKE SWIMMING, SINGLES TENNIS, FOOTBALL, BASKETBALL, OR SKIING: YES
TOTAL_SCORE: 58.2

## 2024-05-02 ASSESSMENT — ENCOUNTER SYMPTOMS
CONSTITUTIONAL NEGATIVE: 1
RESPIRATORY NEGATIVE: 1
NEUROLOGICAL NEGATIVE: 1
CARDIOVASCULAR NEGATIVE: 1
GASTROINTESTINAL NEGATIVE: 1

## 2024-05-02 NOTE — CPM/PAT NURSE NOTE
CPM/PAT Nurse Note      Name: Daria Isaac (Daria Isaac)  /Age: 1990/33 y.o. is being seen in [facility] on 2024 for Robotic myomectomy, ovarian cystectomy, excision of endometriosis, possible appendectomy, and any indicated procedure with Scarlett Park MD on 2024.    PCP: Pt does not currently have one.     -EKG 2023  Normal sinus rhythm with sinus arrhythmia  Cannot rule out anterior infarct, age undetermined    GYN: Raegan Mcadams, , LOV 2023 for AUB    GYN SRG: Scarlett Park, , LOV 2024 for endometriosis and uterine fibroid      Past Medical History:   Diagnosis Date    Adnexal mass     Anemia     Cyst of ovary, right     Dysfunctional uterine bleeding     Endometriosis     Fibroid uterus     Hypertension     a little high in office, no meds    Recurrent pregnancy loss        Past Surgical History:   Procedure Laterality Date    NO PAST SURGERIES         Patient Sexual activity questions deferred to the physician.    No family history on file.    Allergies   Allergen Reactions    Bee Pollen Other       Prior to Admission medications    Medication Sig Start Date End Date Taking? Authorizing Provider   acetaminophen (Tylenol) 325 mg tablet Take 2 tablets (650 mg) by mouth every 6 hours if needed for mild pain (1 - 3). 10/1/23  Yes Radha Milligan MD   drospirenone, contraceptive, 4 mg (28) tablet Take 1 tablet by mouth once daily. 24  Yes Scarlett Park MD   ferrous sulfate 325 (65 Fe) MG tablet TAKE 1 TABLET BY MOUTH TWICE DAILY WITH MEALS. 23 Yes Raegan Mcadams MD   ibuprofen 600 mg tablet Take 1 tablet (600 mg) by mouth every 6 hours if needed for fever (temp greater than 38.0 C). 10/1/23  Yes Radha Milligan MD   ferrous sulfate 325 (65 Fe) MG EC tablet Take 1 tablet (325 mg) by mouth 3 times a day with meals. Do not crush, chew, or split. 10/1/23 5/2/24  Radha Milligan MD        [facility] ROS:   Constitutional:   neg    Neuro/Psych:   neg     Eyes:   Ears:   Nose:   neg    Mouth:   Throat:   neg    Neck:   Cardio:   neg    Respiratory:   neg    Endocrine:   GI:   neg    :   neg    Musculoskeletal:   Hematologic:   neg    Skin:      DASI Risk Score      Flowsheet Row Most Recent Value   DASI SCORE 58.2   METS Score (Will be calculated only when all the questions are answered) 9.9          Caprini DVT Assessment    No data to display       Modified Frailty Index    No data to display       CHADS2 Stroke Risk  Current as of today        N/A 3 to 100%: High Risk   2 to < 3%: Medium Risk   0 to < 2%: Low Risk     Last Change: N/A          This score determines the patient's risk of having a stroke if the patient has atrial fibrillation.        This score is not applicable to this patient. Components are not calculated.          Revised Cardiac Risk Index    No data to display       Apfel Simplified Score    No data to display       Risk Analysis Index Results This Encounter    No data found in the last 1 encounters.         Nurse Plan of Action:     Medication Instructions: Please stop all vitamins, supplements, and any NSAIDs (Alevel, Ibuprofen, Motrin, etc) 7 days prior to surgery.

## 2024-05-09 ENCOUNTER — PRE-ADMISSION TESTING (OUTPATIENT)
Dept: PREADMISSION TESTING | Facility: HOSPITAL | Age: 34
End: 2024-05-09
Payer: COMMERCIAL

## 2024-05-09 VITALS
RESPIRATION RATE: 16 BRPM | DIASTOLIC BLOOD PRESSURE: 98 MMHG | BODY MASS INDEX: 34.58 KG/M2 | TEMPERATURE: 98.2 F | HEART RATE: 86 BPM | WEIGHT: 247 LBS | HEIGHT: 71 IN | SYSTOLIC BLOOD PRESSURE: 147 MMHG

## 2024-05-09 DIAGNOSIS — N80.9 ENDOMETRIOSIS: ICD-10-CM

## 2024-05-09 DIAGNOSIS — D21.9 FIBROID: Primary | ICD-10-CM

## 2024-05-09 DIAGNOSIS — N83.209 CYST OF OVARY, UNSPECIFIED LATERALITY: ICD-10-CM

## 2024-05-09 LAB
ABO GROUP (TYPE) IN BLOOD: NORMAL
ANION GAP SERPL CALC-SCNC: 16 MMOL/L (ref 10–20)
ANTIBODY SCREEN: NORMAL
BUN SERPL-MCNC: 14 MG/DL (ref 6–23)
CALCIUM SERPL-MCNC: 8.9 MG/DL (ref 8.6–10.6)
CANCER AG125 SERPL-ACNC: 46.1 U/ML (ref 0–30.2)
CANCER AG19-9 SERPL-ACNC: <4 U/ML
CEA SERPL-MCNC: 0.6 UG/L
CHLORIDE SERPL-SCNC: 104 MMOL/L (ref 98–107)
CO2 SERPL-SCNC: 22 MMOL/L (ref 21–32)
CREAT SERPL-MCNC: 0.69 MG/DL (ref 0.5–1.05)
EGFRCR SERPLBLD CKD-EPI 2021: >90 ML/MIN/1.73M*2
ERYTHROCYTE [DISTWIDTH] IN BLOOD BY AUTOMATED COUNT: 16.4 % (ref 11.5–14.5)
GLUCOSE SERPL-MCNC: 76 MG/DL (ref 74–99)
HCT VFR BLD AUTO: 29.8 % (ref 36–46)
HGB BLD-MCNC: 8.8 G/DL (ref 12–16)
MCH RBC QN AUTO: 21.4 PG (ref 26–34)
MCHC RBC AUTO-ENTMCNC: 29.5 G/DL (ref 32–36)
MCV RBC AUTO: 73 FL (ref 80–100)
NRBC BLD-RTO: 0 /100 WBCS (ref 0–0)
PLATELET # BLD AUTO: 407 X10*3/UL (ref 150–450)
POTASSIUM SERPL-SCNC: 4.5 MMOL/L (ref 3.5–5.3)
RBC # BLD AUTO: 4.11 X10*6/UL (ref 4–5.2)
RH FACTOR (ANTIGEN D): NORMAL
SODIUM SERPL-SCNC: 137 MMOL/L (ref 136–145)
WBC # BLD AUTO: 4.4 X10*3/UL (ref 4.4–11.3)

## 2024-05-09 PROCEDURE — 86900 BLOOD TYPING SEROLOGIC ABO: CPT

## 2024-05-09 PROCEDURE — 86923 COMPATIBILITY TEST ELECTRIC: CPT

## 2024-05-09 PROCEDURE — 80048 BASIC METABOLIC PNL TOTAL CA: CPT

## 2024-05-09 PROCEDURE — 83625 ASSAY OF LDH ENZYMES: CPT

## 2024-05-09 PROCEDURE — 86850 RBC ANTIBODY SCREEN: CPT

## 2024-05-09 PROCEDURE — 36415 COLL VENOUS BLD VENIPUNCTURE: CPT

## 2024-05-09 PROCEDURE — 82378 CARCINOEMBRYONIC ANTIGEN: CPT

## 2024-05-09 PROCEDURE — 85027 COMPLETE CBC AUTOMATED: CPT

## 2024-05-09 PROCEDURE — 99204 OFFICE O/P NEW MOD 45 MIN: CPT | Performed by: NURSE PRACTITIONER

## 2024-05-09 PROCEDURE — 86301 IMMUNOASSAY TUMOR CA 19-9: CPT

## 2024-05-09 PROCEDURE — 86304 IMMUNOASSAY TUMOR CA 125: CPT

## 2024-05-09 RX ORDER — CHLORHEXIDINE GLUCONATE 40 MG/ML
SOLUTION TOPICAL DAILY PRN
Qty: 473 ML | Refills: 0 | Status: SHIPPED | OUTPATIENT
Start: 2024-05-09 | End: 2024-05-22 | Stop reason: HOSPADM

## 2024-05-09 ASSESSMENT — DUKE ACTIVITY SCORE INDEX (DASI)
CAN YOU PARTICIPATE IN MODERATE RECREATIONAL ACTIVITIES LIKE GOLF, BOWLING, DANCING, DOUBLES TENNIS OR THROWING A BASEBALL OR FOOTBALL: YES
CAN YOU DO HEAVY WORK AROUND THE HOUSE LIKE SCRUBBING FLOORS OR LIFTING AND MOVING HEAVY FURNITURE: YES
CAN YOU HAVE SEXUAL RELATIONS: YES
DASI METS SCORE: 9.9
CAN YOU DO YARD WORK LIKE RAKING LEAVES, WEEDING OR PUSHING A MOWER: YES
CAN YOU PARTICIPATE IN STRENOUS SPORTS LIKE SWIMMING, SINGLES TENNIS, FOOTBALL, BASKETBALL, OR SKIING: YES
CAN YOU DO LIGHT WORK AROUND THE HOUSE LIKE DUSTING OR WASHING DISHES: YES
CAN YOU CLIMB A FLIGHT OF STAIRS OR WALK UP A HILL: YES
CAN YOU WALK INDOORS, SUCH AS AROUND YOUR HOUSE: YES
CAN YOU TAKE CARE OF YOURSELF (EAT, DRESS, BATHE, OR USE TOILET): YES
CAN YOU WALK A BLOCK OR TWO ON LEVEL GROUND: YES
CAN YOU DO MODERATE WORK AROUND THE HOUSE LIKE VACUUMING, SWEEPING FLOORS OR CARRYING GROCERIES: YES
CAN YOU RUN A SHORT DISTANCE: YES
TOTAL_SCORE: 58.2

## 2024-05-09 ASSESSMENT — ENCOUNTER SYMPTOMS
ENDOCRINE NEGATIVE: 1
RESPIRATORY NEGATIVE: 1
EYES NEGATIVE: 1
GASTROINTESTINAL NEGATIVE: 1
CONSTITUTIONAL NEGATIVE: 1
CARDIOVASCULAR NEGATIVE: 1
NECK NEGATIVE: 1
MUSCULOSKELETAL NEGATIVE: 1
NEUROLOGICAL NEGATIVE: 1

## 2024-05-09 ASSESSMENT — LIFESTYLE VARIABLES: SMOKING_STATUS: NONSMOKER

## 2024-05-09 NOTE — PREPROCEDURE INSTRUCTIONS
Fasting Guidelines    NPO Instructions:    Do not eat any food after midnight the night before your surgery/procedure.  You may have up to TEN ounces of clear liquids until TWO hours before your instructed arrival time to the hospital. This includes water, black tea/coffee, (no milk or cream), apple juice, and/or electrolyte drinks (Gatorade).  You may chew gum up to TWO hours before your surgery/procedure.    Additional Instructions:     We have sent a prescription for Hibiclens soap and Peridex mouth wash to your preferred pharmacy.  If you have not already, Please  your prescription and start using as directed before surgery.  Follow the instruction sheet provided to you at your CPM/PAT appointment.    Avoid herbal supplements, multivitamins and NSAIDS (non-steroidal anti-inflammatory drugs) such as Advil, Aleve, Ibuprofen, Naproxen, Excedrin, Meloxicam or Celebrex for at least 7 days prior to surgery. May take Tylenol as needed.    Avoid tobacco and alcohol products for 24 hours prior to surgery.    CONTACT SURGEON'S OFFICE IF YOU DEVELOP:  * Fever = 100.4 F   * New respiratory symptoms (e.g. cough, shortness of breath, respiratory distress, sore throat)  * Recent loss of taste or smell  *Flu like symptoms such as headache, fatigue or gastrointestinal symptoms  * You develop any open sores, shingles, burning or painful urination   AND/OR:  * You no longer wish to have the surgery.  * Any other personal circumstances change that may lead to the need to cancel or defer this surgery.  *You were admitted to any hospital within one week of your planned procedure.    Seven/Six Days before Surgery:  Review your medication instructions, stop indicated medications    Day of Surgery:  Review your medication instructions, take indicated medications  Wear comfortable loose fitting clothing  Do not use moisturizers, creams, lotions or perfume  All jewelry and valuables should be left at home      [facility]  [phone]           Patient Information: Pre-Operative Infection Prevention Measures     Why did I have my nose, under my arms, and groin swabbed?  The purpose of the swab is to identify Staphylococcus aureus inside your nose or on your skin.  The swab was sent to the laboratory for culture.  A positive swab/culture for Staphylococcus aureus is called colonization or carriage.      What is Staphylococcus aureus?  Staphylococcus aureus, also known as “staph”, is a germ found on the skin or in the nose of healthy people.  Sometimes Staphylococcus aureus can get into the body and cause an infection.  This can be minor (such as pimples, boils, or other skin problems).  It might also be serious (such as a blood infection, pneumonia, or a surgical site infection).    What is Staphylococcus aureus colonization or carriage?  Colonization or carriage means that a person has the germ but is not sick from it.  These bacteria can be spread on the hands or when breathing or sneezing.    How is Staphylococcus aureus spread?  It is most often spread by close contact with a person or item that carries it.    What happens if my culture is positive for Staphylococcus aureus?  Your doctor/medical team will use this information to guide any antibiotic treatment which may be necessary.  Regardless of the culture results, we will clean the inside of your nose with a betadine swab just before you have your surgery.      Will I get an infection if I have Staphylococcus aureus in my nose or on my skin?  Anyone can get an infection with Staphylococcus aureus.  However, the best way to reduce your risk of infection is to follow the instructions provided to you for the use of your CHG soap and dental rinse.        Patient Information: Oral/Dental Rinse    What is oral/dental rinse?   It is a mouthwash. It is a way of cleaning the mouth with a germ-killing solution before your surgery.  The solution contains  chlorhexidine, commonly known as CHG.   It is used inside the mouth to kill a bacteria known as Staphylococcus aureus.  Let your doctor know if you are allergic to Chlorhexidine.    Why do I need to use CHG oral/dental rinse?  The CHG oral/dental rinse helps to kill a bacteria in your mouth known as Staphylococcus aureus.     This reduces the risk of infection at the surgical site.      Using your CHG oral/dental rinse  STEPS:  Use your CHG oral/dental rinse after you brush your teeth the night before (at bedtime) and the morning of your surgery.  Follow all directions on your prescription label.    Use the cap on the container to measure 15ml   Swish (gargle if you can) the mouthwash in your mouth for at least 30 seconds, (do not swallow) and spit out  After you use your CHG rinse, do not rinse your mouth with water, drink or eat.  Please refer to the prescription label for the appropriate time to resume oral intake      What side effects might I have using the CHG oral/dental rinse?  CHG rinse will stick to plaque on the teeth.  Brush and floss just before use.  Teeth brushing will help avoid staining of plaque during use.      Patient Information: Home Preoperative Antibacterial Shower      What is a home preoperative antibacterial shower?  This shower is a way of cleaning the skin with a germ-killing solution before surgery.  The solution contains chlorhexidine, commonly known as CHG.  CHG is a skin cleanser with germ-killing ability.  Let your doctor know if you are allergic to chlorhexidine.    Why do I need to take a preoperative antibacterial shower?  Skin is not sterile.  It is best to try to make your skin as free of germs as possible before surgery.  Proper cleansing with a germ-killing soap before surgery can lower the number of germs on your skin.  This helps to reduce the risk of infection at the surgical site.  Following the instructions listed below will help you prepare your skin for surgery.       How do I use the solution?  Steps:  Begin using your CHG soap 5 days before your scheduled surgery on ________________________.    First, wash and rinse your hair using the CHG soap. Keep CHG soap away from ear canals and eyes.  Rinse completely, do not condition.  Hair extensions should be removed.  Wash your face with your normal soap and rinse.    Apply the CHG solution to a clean wet washcloth.  Turn the water off or move away from the water spray to avoid premature rinsing of the CHG soap as you are applying.   Firmly lather your entire body from the neck down.  Do not use on your face.  Pay special attention to the area(s) where your incision(s) will be located unless they are on your face.  Avoid scrubbing your skin too hard.  The important point is to have the CHG soap sit on your skin for 3 minutes.    When the 3 minutes are up, turn on the water and rinse the CHG solution off your body completely.   DO NOT wash with regular soap after you have used the CHG soap solution  Pat yourself dry with a clean, freshly-laundered towel.  DO NOT apply powders, deodorants, or lotions.  Dress in clean, freshly laundered nightclothes.    Be sure to sleep with clean, freshly laundered sheets.  Be aware that CHG will cause stains on fabrics; if you wash them with bleach after use.  Rinse your washcloth and other linens that have contact with CHG completely.  Use only non-chlorine detergents to launder the items used.   The morning of surgery is the fifth day.  Repeat the above steps and dress in clean comfortable clothing     Whom should I contact if I have any questions regarding the use of CHG soap?  Call the [facility], [facility] at [phone] if you have any questions.               Preoperative Brain Exercises    What are brain exercises?  A brain exercise is any activity that engages your thinking (cognitive) skills.    What types of activities are  considered brain exercises?  Jigsaw puzzles, crossword puzzles, word jumble, memory games, word search, and many more.  Many can be found free online or on your phone via a mobile dipika.    Why should I do brain exercises before my surgery?  More recent research has shown brain exercise before surgery can lower the risk of postoperative delirium (confusion) which can be especially important for older adults.  Patients who did brain exercises for 5 to 10 hours the days before surgery, cut their risk of postoperative delirium in half up to 1 week after surgery.         [facility]    Preoperative Deep Breathing Exercises    Why it is important to do deep breathing exercises before my surgery?  Deep breathing exercises strengthen your breathing muscles.  This helps you to recover after your surgery and decreases the chance of breathing complications.      How are the deep breathing exercises done?  Sit straight with your back supported.  Breathe in deeply and slowly through your nose. Your lower rib cage should expand and your abdomen may move forward.  Hold that breath for 3 to 5 seconds.  Breathe out through pursed lips, slowly and completely.  Rest and repeat 10 times every hour while awake.  Rest longer if you become dizzy or lightheaded.         Patient and Family Education             Ways You Can Help Prevent Blood Clots             This handout explains some simple things you can do to help prevent blood clots.      Blood clots are blockages that can form in the body's veins. When a blood clot forms in your deep veins, it may be called a deep vein thrombosis, or DVT for short. Blood clots can happen in any part of the body where blood flows, but they are most common in the arms and legs. If a piece of a blood clot breaks free and travels to the lungs, it is called a pulmonary embolus (PE). A PE can be a very serious problem.         Being in the hospital or having surgery can raise your  chances of getting a blood clot because you may not be well enough to move around as much as you normally do.         Ways you can help prevent blood clots in the hospital         Wearing SCDs. SCDs stands for Sequential Compression Devices.   SCDs are special sleeves that wrap around your legs  They attach to a pump that fills them with air to gently squeeze your legs every few minutes.   This helps return the blood in your legs to your heart.   SCDs should only be taken off when walking or bathing.   SCDs may not be comfortable, but they can help save your life.               Wearing compression stockings - if your doctor orders them. These special snug fitting stockings gently squeeze your legs to help blood flow.       Walking. Walking helps move the blood in your legs.   If your doctor says it is ok, try walking the halls at least   5 times a day. Ask us to help you get up, so you don't fall.      Taking any blood thinning medicines your doctor orders.        Page 1 of 2     [facility]; 3/23   Ways you can help prevent blood clots at home       Wearing compression stockings - if your doctor orders them. ? Walking - to help move the blood in your legs.       Taking any blood thinning medicines your doctor orders.      Signs of a blood clot or PE      Tell your doctor or nurse know right away if you have of the problems listed below.    If you are at home, seek medical care right away. Call 911 for chest pain or problems breathing.               Signs of a blood clot (DVT) - such as pain,  swelling, redness or warmth in your arm or leg      Signs of a pulmonary embolism (PE) - such as chest     pain or feeling short of breath

## 2024-05-09 NOTE — CPM/PAT H&P
----- Message from Betsy Monroy sent at 9/11/2020  9:34 AM CDT -----  Type:  Patient Returning Call    Who Called:  patient   Who Left Message for Patient:  tori   Does the patient know what this is regarding?:  yes   Best Call Back Number:  [phone]  Additional Information:  Please advise-thank you       CPM/PAT Evaluation       Name: Daria Isaac (Daria Isaac)  /Age: 1990/33 y.o.     Visit Type:   In-Person       Chief Complaint: Fibroid  Endometriosis  Cyst of ovary, unspecified laterality      HPI  Pt is a 33 year old female with a PMHx significant for suspected endometriosis, fibroid uterus and adnexal mass. Pt is being evaluated in CPM in anticipation of a Robotic myomectomy, ovarian cystectomy, excision of endometriosis, possible appendectomy and any indicated procedure with Dr. Park on 24.  Past Surgical History:   Procedure Laterality Date    NO PAST SURGERIES       Patient Sexual activity questions deferred to the physician.    Family History   Problem Relation Name Age of Onset    Fibroids Mother      Endometriosis Mother      Hypertension Mother         Allergies   Allergen Reactions    Bee Pollen Other       Prior to Admission medications    Medication Sig Start Date End Date Taking? Authorizing Provider   acetaminophen (Tylenol) 325 mg tablet Take 2 tablets (650 mg) by mouth every 6 hours if needed for mild pain (1 - 3). 10/1/23  Yes Radha Milligan MD   drospirenone, contraceptive, 4 mg (28) tablet Take 1 tablet by mouth once daily. 24  Yes Scarlett Park MD   ferrous sulfate 325 (65 Fe) MG tablet TAKE 1 TABLET BY MOUTH TWICE DAILY WITH MEALS. 23 Yes Raegan Mcadams MD   ibuprofen 600 mg tablet Take 1 tablet (600 mg) by mouth every 6 hours if needed for fever (temp greater than 38.0 C). 10/1/23  Yes Radha Milligan MD        PAT ROS:   Constitutional:   neg    Neuro/Psych:   neg    Eyes:   neg    Ears:   neg    Nose:   neg    Mouth:   neg    Throat:   neg    Neck:   neg    Cardio:   neg    Respiratory:   neg    Endocrine:   neg    GI:   neg    :   neg    Musculoskeletal:   neg    Hematologic:   neg    Skin:  neg        Physical Exam  Constitutional:       Appearance: Normal appearance.   HENT:      Head: Normocephalic and atraumatic.      Nose:  Nose normal.      Mouth/Throat:      Mouth: Mucous membranes are moist.   Eyes:      Pupils: Pupils are equal, round, and reactive to light.   Cardiovascular:      Rate and Rhythm: Normal rate and regular rhythm.      Pulses: Normal pulses.      Heart sounds: Normal heart sounds.   Pulmonary:      Effort: Pulmonary effort is normal.      Breath sounds: Normal breath sounds.   Abdominal:      Palpations: Abdomen is soft.   Musculoskeletal:         General: Normal range of motion.      Cervical back: Normal range of motion.   Skin:     General: Skin is warm.   Neurological:      General: No focal deficit present.      Mental Status: She is alert and oriented to person, place, and time.   Psychiatric:         Mood and Affect: Mood normal.         Behavior: Behavior normal.          PAT AIRWAY:   Airway:     Mallampati::  II    TM distance::  >3 FB    Neck ROM::  Full  normal        Visit Vitals  BP (!) 147/98   Pulse 86   Temp 36.8 °C (98.2 °F)   Resp 16       DASI Risk Score      Flowsheet Row Most Recent Value   DASI SCORE 58.2   METS Score (Will be calculated only when all the questions are answered) 9.9          Caprini DVT Assessment      Flowsheet Row Most Recent Value   DVT Score 7   Current Status Major surgery planned, lasting 2-3 hours   Women Oral contraceptives   Age Less than 40 years   BMI 31-40 (Obesity)          Modified Frailty Index      Flowsheet Row Most Recent Value   Modified Frailty Index Calculator 0          CHADS2 Stroke Risk  Current as of 2 minutes ago        N/A 3 to 100%: High Risk   2 to < 3%: Medium Risk   0 to < 2%: Low Risk     Last Change: N/A          This score determines the patient's risk of having a stroke if the patient has atrial fibrillation.        This score is not applicable to this patient. Components are not calculated.          Revised Cardiac Risk Index      Flowsheet Row Most Recent Value   Revised Cardiac Risk Calculator 0          Apfel Simplified Score       Flowsheet Row Most Recent Value   Apfel Simplified Score Calculator 3          Risk Analysis Index Results This Encounter    No data found in the last 1 encounters.       Stop Bang Score      Flowsheet Row Most Recent Value   Do you snore loudly? 1   Do you often feel tired or fatigued after your sleep? 0   Has anyone ever observed you stop breathing in your sleep? 0   Do you have or are you being treated for high blood pressure? 0   Recent BMI (Calculated) 34.5   Is BMI greater than 35 kg/m2? 0=No   Age older than 50 years old? 0=No   Is your neck circumference greater than 17 inches (Male) or 16 inches (Female)? 0   Gender - Male 0=No   STOP-BANG Total Score 1            Assessment and Plan:     Anesthesia:  The patient denies problems with anesthesia in the past such as PONV, prolonged sedation, awareness, dental damage, aspiration, cardiac arrest, difficult intubation, or unexpected hospital admissions.     Neuro:   The patient has no neurological diagnoses or significant findings on chart review, clinical presentation, and evaluation. No grossly apparent neurological perioperative risk.    HEENT/Airway  No diagnoses, significant findings on chart review, clinical presentation, or evaluation.    Cardiovascular  The patient is scheduled for non-cardiac surgery associated with elevated risk. The patient has no major cardiac contraindications to non- cardiac surgery.  RCRI  The patient meets 0-1 RCRI criteria and therefore has a less than 1% risk of major adverse cardiac complications.  METS  The patient's functional capacity capacity is greater than 4 METS.  EKG  The patient has no EKG or echocardiographic changes concerning for myocardial ischemia.   Heart Failure  The patient has no known history of heart failure.  Additionally, the patient reports no symptoms of heart failure and demonstrates no signs of heart failure.  Hypertension Evaluation  The patient has no known history of hypertension and has a  normal blood pressure today.  Heart Rhythm Evaluation  The patient has no history of arrhythmias.  Heart Valve Evaluation  The patient has no known history of valvular heart disease. The patient has no symptoms or physical exam findings to suggest valvular heart disease.  Cardiology Evaluation  The patient is not followed by cardiology.    The patient has a 30-day risk for MACE of 0 predictors, 3.9% risk for cardiac death, nonfatal myocardial infarction, and nonfatal cardiac arrest.  CUAUHTEMOC score which indicates a 0% risk of intraoperative or 30-day postoperative.    Pulmonary   No significant findings on chart review or clinical presentation and evaluation.    The patient has a stop bang score of 1, which places patient at low risk for having GABRIELA.    ARISCAT 16, low, 1.6% risk of in-hospital postoperative pulmonary complications  PRODIGY 0, low risk of respiratory depression episode. Patient given PI sheet for preoperative deep breathing exercises.    Hematology  The patient has diagnoses or significant findings on chart review or clinical presentation and evaluation significant for anemia  Antiplatelet management   The patient is not currently receiving antiplatelet therapy.  Anticoagulation management  The patient is not currently receiving anticoagulation therapy.    Caprini score 7, high risk of perioperative VTE.     Patient instructed to ambulate as soon as possible postoperatively to decrease thromboembolic risk. Initiate mechanical DVT prophylaxis as soon as possible and initiate chemical prophylaxis when deemed safe from a bleeding standpoint post surgery.     Transfusion Evaluation  A type and screen was obtained given the likelihood for perioperative transfusion of blood or blood products.    Gastrointestinal  No diagnoses or significant findings on chart review or clinical presentation and evaluation.    Eat 10- 0,  self-perceived oropharyngeal dysphagia scale (0-40)     Genitourinary  No diagnoses or  significant findings on chart review or clinical presentation and evaluation.    Renal  The patient has no known history of chronic kidney disease.    Musculoskeletal  No diagnoses or significant findings on chart review or clinical presentation and evaluation.    Endocrine  Diabetes Evaluation  The patient has no history of diabetes mellitus.  Thyroid Disease Evaluation  The patient has no history of thyroid disease.    ID  No diagnoses or significant findings on chart review or clinical presentation and evaluation.    -Preoperative medication instructions were provided and reviewed with the patient.  Any additional testing or evaluation was explained to the patient.  NPO Instructions were discussed, and the patient's questions were answered prior to conclusion of this encounter.     Recent Results (from the past 168 hour(s))   Lactate dehydrogenase, isoenzymes    Collection Time: 05/09/24  3:41 PM   Result Value Ref Range    LD Isoenzyme 1 23 14 - 27 %    LD Isoenzyme 2 32 29 - 42 %    LD Isoenzyme 3 26 18 - 30 %    LD Isoenzyme 4 9 8 - 15 %    LD Isoenzyme 5 10 6 - 23 %    Lactate Dehydrogenase, Total 228 105 - 230 U/L       Collection Time: 05/09/24  3:41 PM   Result Value Ref Range    Cancer  46.1 (H) 0.0 - 30.2 U/mL   CEA    Collection Time: 05/09/24  3:41 PM   Result Value Ref Range    Carcinoembryonic AG 0.6 ug/L   Cancer Antigen 19-9    Collection Time: 05/09/24  3:41 PM   Result Value Ref Range    Cancer AG 19-9 <4.00 <35.00 U/mL   CBC    Collection Time: 05/09/24  3:41 PM   Result Value Ref Range    WBC 4.4 4.4 - 11.3 x10*3/uL    nRBC 0.0 0.0 - 0.0 /100 WBCs    RBC 4.11 4.00 - 5.20 x10*6/uL    Hemoglobin 8.8 (L) 12.0 - 16.0 g/dL    Hematocrit 29.8 (L) 36.0 - 46.0 %    MCV 73 (L) 80 - 100 fL    MCH 21.4 (L) 26.0 - 34.0 pg    MCHC 29.5 (L) 32.0 - 36.0 g/dL    RDW 16.4 (H) 11.5 - 14.5 %    Platelets 407 150 - 450 x10*3/uL   Basic Metabolic Panel    Collection Time: 05/09/24  3:41 PM   Result  Value Ref Range    Glucose 76 74 - 99 mg/dL    Sodium 137 136 - 145 mmol/L    Potassium 4.5 3.5 - 5.3 mmol/L    Chloride 104 98 - 107 mmol/L    Bicarbonate 22 21 - 32 mmol/L    Anion Gap 16 10 - 20 mmol/L    Urea Nitrogen 14 6 - 23 mg/dL    Creatinine 0.69 0.50 - 1.05 mg/dL    eGFR >90 >60 mL/min/1.73m*2    Calcium 8.9 8.6 - 10.6 mg/dL   Type And Screen    Collection Time: 05/09/24  3:41 PM   Result Value Ref Range    ABO TYPE A     Rh TYPE POS     ANTIBODY SCREEN NEG       Dr. Park notified of abnormal lab results.

## 2024-05-11 LAB
LDH SERPL L TO P-CCNC: 228 U/L (ref 105–230)
LDH1 CFR SERPL ELPH: 23 % (ref 14–27)
LDH2 CFR SERPL ELPH: 32 % (ref 29–42)
LDH3 CFR SERPL ELPH: 26 % (ref 18–30)
LDH4 CFR SERPL ELPH: 9 % (ref 8–15)
LDH5 CFR SERPL ELPH: 10 % (ref 6–23)

## 2024-05-17 ENCOUNTER — ANESTHESIA EVENT (OUTPATIENT)
Dept: OPERATING ROOM | Facility: HOSPITAL | Age: 34
DRG: 742 | End: 2024-05-17
Payer: COMMERCIAL

## 2024-05-20 ENCOUNTER — ANESTHESIA (OUTPATIENT)
Dept: OPERATING ROOM | Facility: HOSPITAL | Age: 34
DRG: 742 | End: 2024-05-20
Payer: COMMERCIAL

## 2024-05-20 ENCOUNTER — HOSPITAL ENCOUNTER (INPATIENT)
Facility: HOSPITAL | Age: 34
LOS: 1 days | Discharge: HOME | DRG: 742 | End: 2024-05-22
Attending: STUDENT IN AN ORGANIZED HEALTH CARE EDUCATION/TRAINING PROGRAM | Admitting: STUDENT IN AN ORGANIZED HEALTH CARE EDUCATION/TRAINING PROGRAM
Payer: COMMERCIAL

## 2024-05-20 ENCOUNTER — SURGERY (OUTPATIENT)
Age: 34
DRG: 742 | End: 2024-05-20
Payer: COMMERCIAL

## 2024-05-20 ENCOUNTER — APPOINTMENT (OUTPATIENT)
Dept: RADIOLOGY | Facility: HOSPITAL | Age: 34
DRG: 742 | End: 2024-05-20
Payer: COMMERCIAL

## 2024-05-20 DIAGNOSIS — I82.419 ACUTE DEEP VEIN THROMBOSIS (DVT) OF FEMORAL VEIN, UNSPECIFIED LATERALITY (MULTI): ICD-10-CM

## 2024-05-20 DIAGNOSIS — N80.9 ENDOMETRIOSIS: ICD-10-CM

## 2024-05-20 DIAGNOSIS — Z98.890 STATUS POST SURGERY: ICD-10-CM

## 2024-05-20 DIAGNOSIS — D21.9 FIBROID: Primary | ICD-10-CM

## 2024-05-20 DIAGNOSIS — N83.209 CYST OF OVARY, UNSPECIFIED LATERALITY: ICD-10-CM

## 2024-05-20 LAB
ABO GROUP (TYPE) IN BLOOD: NORMAL
PREGNANCY TEST URINE, POC: NEGATIVE
RH FACTOR (ANTIGEN D): NORMAL

## 2024-05-20 PROCEDURE — 0UB64ZZ EXCISION OF LEFT FALLOPIAN TUBE, PERCUTANEOUS ENDOSCOPIC APPROACH: ICD-10-PCS | Performed by: STUDENT IN AN ORGANIZED HEALTH CARE EDUCATION/TRAINING PROGRAM

## 2024-05-20 PROCEDURE — 8E0W4CZ ROBOTIC ASSISTED PROCEDURE OF TRUNK REGION, PERCUTANEOUS ENDOSCOPIC APPROACH: ICD-10-PCS | Performed by: STUDENT IN AN ORGANIZED HEALTH CARE EDUCATION/TRAINING PROGRAM

## 2024-05-20 PROCEDURE — A58661 PR LAP,RMV  ADNEXAL STRUCTURE: Performed by: NURSE ANESTHETIST, CERTIFIED REGISTERED

## 2024-05-20 PROCEDURE — 2500000004 HC RX 250 GENERAL PHARMACY W/ HCPCS (ALT 636 FOR OP/ED): Performed by: STUDENT IN AN ORGANIZED HEALTH CARE EDUCATION/TRAINING PROGRAM

## 2024-05-20 PROCEDURE — 2780000003 HC OR 278 NO HCPCS: Performed by: STUDENT IN AN ORGANIZED HEALTH CARE EDUCATION/TRAINING PROGRAM

## 2024-05-20 PROCEDURE — 0UB94ZZ EXCISION OF UTERUS, PERCUTANEOUS ENDOSCOPIC APPROACH: ICD-10-PCS | Performed by: STUDENT IN AN ORGANIZED HEALTH CARE EDUCATION/TRAINING PROGRAM

## 2024-05-20 PROCEDURE — 7100000002 HC RECOVERY ROOM TIME - EACH INCREMENTAL 1 MINUTE: Performed by: STUDENT IN AN ORGANIZED HEALTH CARE EDUCATION/TRAINING PROGRAM

## 2024-05-20 PROCEDURE — 88304 TISSUE EXAM BY PATHOLOGIST: CPT | Performed by: STUDENT IN AN ORGANIZED HEALTH CARE EDUCATION/TRAINING PROGRAM

## 2024-05-20 PROCEDURE — 0DNW4ZZ RELEASE PERITONEUM, PERCUTANEOUS ENDOSCOPIC APPROACH: ICD-10-PCS | Performed by: STUDENT IN AN ORGANIZED HEALTH CARE EDUCATION/TRAINING PROGRAM

## 2024-05-20 PROCEDURE — 2500000004 HC RX 250 GENERAL PHARMACY W/ HCPCS (ALT 636 FOR OP/ED)

## 2024-05-20 PROCEDURE — 88302 TISSUE EXAM BY PATHOLOGIST: CPT | Performed by: STUDENT IN AN ORGANIZED HEALTH CARE EDUCATION/TRAINING PROGRAM

## 2024-05-20 PROCEDURE — 85520 HEPARIN ASSAY: CPT

## 2024-05-20 PROCEDURE — 80048 BASIC METABOLIC PNL TOTAL CA: CPT | Performed by: STUDENT IN AN ORGANIZED HEALTH CARE EDUCATION/TRAINING PROGRAM

## 2024-05-20 PROCEDURE — 0UB04ZZ EXCISION OF RIGHT OVARY, PERCUTANEOUS ENDOSCOPIC APPROACH: ICD-10-PCS | Performed by: STUDENT IN AN ORGANIZED HEALTH CARE EDUCATION/TRAINING PROGRAM

## 2024-05-20 PROCEDURE — 93971 EXTREMITY STUDY: CPT | Performed by: RADIOLOGY

## 2024-05-20 PROCEDURE — 36430 TRANSFUSION BLD/BLD COMPNT: CPT | Mod: GC

## 2024-05-20 PROCEDURE — G0378 HOSPITAL OBSERVATION PER HR: HCPCS

## 2024-05-20 PROCEDURE — 2500000001 HC RX 250 WO HCPCS SELF ADMINISTERED DRUGS (ALT 637 FOR MEDICARE OP): Performed by: STUDENT IN AN ORGANIZED HEALTH CARE EDUCATION/TRAINING PROGRAM

## 2024-05-20 PROCEDURE — 85027 COMPLETE CBC AUTOMATED: CPT | Performed by: STUDENT IN AN ORGANIZED HEALTH CARE EDUCATION/TRAINING PROGRAM

## 2024-05-20 PROCEDURE — 3600000005 HC OR TIME - INITIAL BASE CHARGE - PROCEDURE LEVEL FIVE: Performed by: STUDENT IN AN ORGANIZED HEALTH CARE EDUCATION/TRAINING PROGRAM

## 2024-05-20 PROCEDURE — 0DTJ4ZZ RESECTION OF APPENDIX, PERCUTANEOUS ENDOSCOPIC APPROACH: ICD-10-PCS | Performed by: COLON & RECTAL SURGERY

## 2024-05-20 PROCEDURE — 58545 LAPAROSCOPIC MYOMECTOMY: CPT | Performed by: STUDENT IN AN ORGANIZED HEALTH CARE EDUCATION/TRAINING PROGRAM

## 2024-05-20 PROCEDURE — 3700000001 HC GENERAL ANESTHESIA TIME - INITIAL BASE CHARGE: Performed by: STUDENT IN AN ORGANIZED HEALTH CARE EDUCATION/TRAINING PROGRAM

## 2024-05-20 PROCEDURE — 2500000005 HC RX 250 GENERAL PHARMACY W/O HCPCS: Performed by: ANESTHESIOLOGY

## 2024-05-20 PROCEDURE — 0UT64ZZ RESECTION OF LEFT FALLOPIAN TUBE, PERCUTANEOUS ENDOSCOPIC APPROACH: ICD-10-PCS | Performed by: STUDENT IN AN ORGANIZED HEALTH CARE EDUCATION/TRAINING PROGRAM

## 2024-05-20 PROCEDURE — P9016 RBC LEUKOCYTES REDUCED: HCPCS

## 2024-05-20 PROCEDURE — 2500000004 HC RX 250 GENERAL PHARMACY W/ HCPCS (ALT 636 FOR OP/ED): Performed by: ANESTHESIOLOGY

## 2024-05-20 PROCEDURE — 2500000001 HC RX 250 WO HCPCS SELF ADMINISTERED DRUGS (ALT 637 FOR MEDICARE OP): Performed by: NURSE ANESTHETIST, CERTIFIED REGISTERED

## 2024-05-20 PROCEDURE — 58662 LAPAROSCOPY EXCISE LESIONS: CPT | Performed by: PHYSICIAN ASSISTANT

## 2024-05-20 PROCEDURE — P9045 ALBUMIN (HUMAN), 5%, 250 ML: HCPCS | Mod: JZ | Performed by: NURSE ANESTHETIST, CERTIFIED REGISTERED

## 2024-05-20 PROCEDURE — 85730 THROMBOPLASTIN TIME PARTIAL: CPT

## 2024-05-20 PROCEDURE — 2500000004 HC RX 250 GENERAL PHARMACY W/ HCPCS (ALT 636 FOR OP/ED): Performed by: NURSE ANESTHETIST, CERTIFIED REGISTERED

## 2024-05-20 PROCEDURE — 44950 APPENDECTOMY: CPT | Performed by: COLON & RECTAL SURGERY

## 2024-05-20 PROCEDURE — 7100000001 HC RECOVERY ROOM TIME - INITIAL BASE CHARGE: Performed by: STUDENT IN AN ORGANIZED HEALTH CARE EDUCATION/TRAINING PROGRAM

## 2024-05-20 PROCEDURE — 83735 ASSAY OF MAGNESIUM: CPT | Performed by: STUDENT IN AN ORGANIZED HEALTH CARE EDUCATION/TRAINING PROGRAM

## 2024-05-20 PROCEDURE — 58661 LAPAROSCOPY REMOVE ADNEXA: CPT | Performed by: STUDENT IN AN ORGANIZED HEALTH CARE EDUCATION/TRAINING PROGRAM

## 2024-05-20 PROCEDURE — 3600000010 HC OR TIME - EACH INCREMENTAL 1 MINUTE - PROCEDURE LEVEL FIVE: Performed by: STUDENT IN AN ORGANIZED HEALTH CARE EDUCATION/TRAINING PROGRAM

## 2024-05-20 PROCEDURE — 2500000006 HC RX 250 W HCPCS SELF ADMINISTERED DRUGS (ALT 637 FOR ALL PAYERS)

## 2024-05-20 PROCEDURE — 0DTJ0ZZ RESECTION OF APPENDIX, OPEN APPROACH: ICD-10-PCS | Performed by: COLON & RECTAL SURGERY

## 2024-05-20 PROCEDURE — 88305 TISSUE EXAM BY PATHOLOGIST: CPT | Performed by: STUDENT IN AN ORGANIZED HEALTH CARE EDUCATION/TRAINING PROGRAM

## 2024-05-20 PROCEDURE — 2500000005 HC RX 250 GENERAL PHARMACY W/O HCPCS: Performed by: NURSE ANESTHETIST, CERTIFIED REGISTERED

## 2024-05-20 PROCEDURE — 58661 LAPAROSCOPY REMOVE ADNEXA: CPT | Performed by: PHYSICIAN ASSISTANT

## 2024-05-20 PROCEDURE — 58545 LAPAROSCOPIC MYOMECTOMY: CPT | Performed by: PHYSICIAN ASSISTANT

## 2024-05-20 PROCEDURE — 58662 LAPAROSCOPY EXCISE LESIONS: CPT | Performed by: STUDENT IN AN ORGANIZED HEALTH CARE EDUCATION/TRAINING PROGRAM

## 2024-05-20 PROCEDURE — 88305 TISSUE EXAM BY PATHOLOGIST: CPT | Mod: TC,SUR,PARLAB | Performed by: STUDENT IN AN ORGANIZED HEALTH CARE EDUCATION/TRAINING PROGRAM

## 2024-05-20 PROCEDURE — 85610 PROTHROMBIN TIME: CPT

## 2024-05-20 PROCEDURE — 3700000002 HC GENERAL ANESTHESIA TIME - EACH INCREMENTAL 1 MINUTE: Performed by: STUDENT IN AN ORGANIZED HEALTH CARE EDUCATION/TRAINING PROGRAM

## 2024-05-20 PROCEDURE — 93970 EXTREMITY STUDY: CPT

## 2024-05-20 PROCEDURE — S2900 ROBOTIC SURGICAL SYSTEM: HCPCS | Performed by: STUDENT IN AN ORGANIZED HEALTH CARE EDUCATION/TRAINING PROGRAM

## 2024-05-20 PROCEDURE — 2720000007 HC OR 272 NO HCPCS: Performed by: STUDENT IN AN ORGANIZED HEALTH CARE EDUCATION/TRAINING PROGRAM

## 2024-05-20 PROCEDURE — 0WBH4ZZ EXCISION OF RETROPERITONEUM, PERCUTANEOUS ENDOSCOPIC APPROACH: ICD-10-PCS | Performed by: STUDENT IN AN ORGANIZED HEALTH CARE EDUCATION/TRAINING PROGRAM

## 2024-05-20 PROCEDURE — A4217 STERILE WATER/SALINE, 500 ML: HCPCS | Performed by: STUDENT IN AN ORGANIZED HEALTH CARE EDUCATION/TRAINING PROGRAM

## 2024-05-20 PROCEDURE — 36415 COLL VENOUS BLD VENIPUNCTURE: CPT | Performed by: STUDENT IN AN ORGANIZED HEALTH CARE EDUCATION/TRAINING PROGRAM

## 2024-05-20 PROCEDURE — A58661 PR LAP,RMV  ADNEXAL STRUCTURE: Performed by: ANESTHESIOLOGY

## 2024-05-20 PROCEDURE — 85027 COMPLETE CBC AUTOMATED: CPT

## 2024-05-20 RX ORDER — SIMETHICONE 80 MG
80 TABLET,CHEWABLE ORAL 4 TIMES DAILY PRN
Status: DISCONTINUED | OUTPATIENT
Start: 2024-05-20 | End: 2024-05-23 | Stop reason: HOSPADM

## 2024-05-20 RX ORDER — METOPROLOL TARTRATE 1 MG/ML
INJECTION, SOLUTION INTRAVENOUS AS NEEDED
Status: DISCONTINUED | OUTPATIENT
Start: 2024-05-20 | End: 2024-05-20

## 2024-05-20 RX ORDER — SODIUM CHLORIDE, SODIUM LACTATE, POTASSIUM CHLORIDE, CALCIUM CHLORIDE 600; 310; 30; 20 MG/100ML; MG/100ML; MG/100ML; MG/100ML
INJECTION, SOLUTION INTRAVENOUS CONTINUOUS PRN
Status: DISCONTINUED | OUTPATIENT
Start: 2024-05-20 | End: 2024-05-20

## 2024-05-20 RX ORDER — MIDAZOLAM HYDROCHLORIDE 1 MG/ML
0.5 INJECTION INTRAMUSCULAR; INTRAVENOUS
Status: DISCONTINUED | OUTPATIENT
Start: 2024-05-20 | End: 2024-05-20 | Stop reason: HOSPADM

## 2024-05-20 RX ORDER — HEPARIN SODIUM 5000 [USP'U]/ML
5000 INJECTION, SOLUTION INTRAVENOUS; SUBCUTANEOUS EVERY 8 HOURS
Status: DISCONTINUED | OUTPATIENT
Start: 2024-05-21 | End: 2024-05-21

## 2024-05-20 RX ORDER — CELECOXIB 200 MG/1
400 CAPSULE ORAL ONCE
Status: COMPLETED | OUTPATIENT
Start: 2024-05-20 | End: 2024-05-20

## 2024-05-20 RX ORDER — ALBUTEROL SULFATE 0.83 MG/ML
2.5 SOLUTION RESPIRATORY (INHALATION) ONCE AS NEEDED
Status: DISCONTINUED | OUTPATIENT
Start: 2024-05-20 | End: 2024-05-20 | Stop reason: HOSPADM

## 2024-05-20 RX ORDER — GABAPENTIN 600 MG/1
600 TABLET ORAL ONCE
Status: COMPLETED | OUTPATIENT
Start: 2024-05-20 | End: 2024-05-20

## 2024-05-20 RX ORDER — LABETALOL HYDROCHLORIDE 5 MG/ML
5 INJECTION, SOLUTION INTRAVENOUS ONCE AS NEEDED
Status: DISCONTINUED | OUTPATIENT
Start: 2024-05-20 | End: 2024-05-20 | Stop reason: HOSPADM

## 2024-05-20 RX ORDER — ACETAMINOPHEN 325 MG/1
975 TABLET ORAL ONCE
Status: COMPLETED | OUTPATIENT
Start: 2024-05-20 | End: 2024-05-20

## 2024-05-20 RX ORDER — HYDROMORPHONE HYDROCHLORIDE 1 MG/ML
0.4 INJECTION, SOLUTION INTRAMUSCULAR; INTRAVENOUS; SUBCUTANEOUS EVERY 5 MIN PRN
Status: DISCONTINUED | OUTPATIENT
Start: 2024-05-20 | End: 2024-05-20 | Stop reason: HOSPADM

## 2024-05-20 RX ORDER — VASOPRESSIN 20 U/ML
INJECTION PARENTERAL
Status: COMPLETED
Start: 2024-05-20 | End: 2024-05-20

## 2024-05-20 RX ORDER — ACETAMINOPHEN 325 MG/1
975 TABLET ORAL EVERY 6 HOURS
Status: DISCONTINUED | OUTPATIENT
Start: 2024-05-20 | End: 2024-05-23 | Stop reason: HOSPADM

## 2024-05-20 RX ORDER — LIDOCAINE HYDROCHLORIDE 10 MG/ML
0.1 INJECTION, SOLUTION EPIDURAL; INFILTRATION; INTRACAUDAL; PERINEURAL ONCE
Status: DISCONTINUED | OUTPATIENT
Start: 2024-05-20 | End: 2024-05-20 | Stop reason: HOSPADM

## 2024-05-20 RX ORDER — HYDROMORPHONE HYDROCHLORIDE 1 MG/ML
0.4 INJECTION, SOLUTION INTRAMUSCULAR; INTRAVENOUS; SUBCUTANEOUS
Status: DISCONTINUED | OUTPATIENT
Start: 2024-05-20 | End: 2024-05-23 | Stop reason: HOSPADM

## 2024-05-20 RX ORDER — MISOPROSTOL 200 UG/1
TABLET ORAL
Status: COMPLETED
Start: 2024-05-20 | End: 2024-05-20

## 2024-05-20 RX ORDER — NALOXONE HYDROCHLORIDE 0.4 MG/ML
0.1 INJECTION, SOLUTION INTRAMUSCULAR; INTRAVENOUS; SUBCUTANEOUS EVERY 5 MIN PRN
Status: DISCONTINUED | OUTPATIENT
Start: 2024-05-20 | End: 2024-05-23 | Stop reason: HOSPADM

## 2024-05-20 RX ORDER — KETOROLAC TROMETHAMINE 30 MG/ML
30 INJECTION, SOLUTION INTRAMUSCULAR; INTRAVENOUS EVERY 6 HOURS
Status: DISCONTINUED | OUTPATIENT
Start: 2024-05-20 | End: 2024-05-21

## 2024-05-20 RX ORDER — POLYETHYLENE GLYCOL 3350 17 G/17G
17 POWDER, FOR SOLUTION ORAL DAILY
Status: DISCONTINUED | OUTPATIENT
Start: 2024-05-20 | End: 2024-05-23 | Stop reason: HOSPADM

## 2024-05-20 RX ORDER — ALBUMIN HUMAN 50 G/1000ML
SOLUTION INTRAVENOUS AS NEEDED
Status: DISCONTINUED | OUTPATIENT
Start: 2024-05-20 | End: 2024-05-20

## 2024-05-20 RX ORDER — HYDRALAZINE HYDROCHLORIDE 20 MG/ML
5 INJECTION INTRAMUSCULAR; INTRAVENOUS EVERY 30 MIN PRN
Status: DISCONTINUED | OUTPATIENT
Start: 2024-05-20 | End: 2024-05-20 | Stop reason: HOSPADM

## 2024-05-20 RX ORDER — OXYCODONE HYDROCHLORIDE 5 MG/1
5 TABLET ORAL EVERY 4 HOURS PRN
Status: DISCONTINUED | OUTPATIENT
Start: 2024-05-20 | End: 2024-05-20 | Stop reason: HOSPADM

## 2024-05-20 RX ORDER — IBUPROFEN 600 MG/1
600 TABLET ORAL EVERY 6 HOURS
Status: DISCONTINUED | OUTPATIENT
Start: 2024-05-21 | End: 2024-05-21

## 2024-05-20 RX ORDER — MIDAZOLAM HYDROCHLORIDE 1 MG/ML
INJECTION INTRAMUSCULAR; INTRAVENOUS AS NEEDED
Status: DISCONTINUED | OUTPATIENT
Start: 2024-05-20 | End: 2024-05-20

## 2024-05-20 RX ORDER — FENTANYL CITRATE 50 UG/ML
INJECTION, SOLUTION INTRAMUSCULAR; INTRAVENOUS AS NEEDED
Status: DISCONTINUED | OUTPATIENT
Start: 2024-05-20 | End: 2024-05-20

## 2024-05-20 RX ORDER — DIPHENHYDRAMINE HYDROCHLORIDE 50 MG/ML
12.5 INJECTION INTRAMUSCULAR; INTRAVENOUS ONCE AS NEEDED
Status: DISCONTINUED | OUTPATIENT
Start: 2024-05-20 | End: 2024-05-20 | Stop reason: HOSPADM

## 2024-05-20 RX ORDER — ALBUTEROL SULFATE 90 UG/1
AEROSOL, METERED RESPIRATORY (INHALATION) AS NEEDED
Status: DISCONTINUED | OUTPATIENT
Start: 2024-05-20 | End: 2024-05-20

## 2024-05-20 RX ORDER — ONDANSETRON HYDROCHLORIDE 2 MG/ML
INJECTION, SOLUTION INTRAVENOUS AS NEEDED
Status: DISCONTINUED | OUTPATIENT
Start: 2024-05-20 | End: 2024-05-20

## 2024-05-20 RX ORDER — HYDROMORPHONE HYDROCHLORIDE 1 MG/ML
INJECTION, SOLUTION INTRAMUSCULAR; INTRAVENOUS; SUBCUTANEOUS AS NEEDED
Status: DISCONTINUED | OUTPATIENT
Start: 2024-05-20 | End: 2024-05-20

## 2024-05-20 RX ORDER — MISOPROSTOL 200 UG/1
400 TABLET ORAL ONCE
Status: DISCONTINUED | OUTPATIENT
Start: 2024-05-20 | End: 2024-05-20 | Stop reason: HOSPADM

## 2024-05-20 RX ORDER — METHOCARBAMOL 100 MG/ML
1000 INJECTION, SOLUTION INTRAMUSCULAR; INTRAVENOUS ONCE
Status: COMPLETED | OUTPATIENT
Start: 2024-05-20 | End: 2024-05-20

## 2024-05-20 RX ORDER — TRAMADOL HYDROCHLORIDE 50 MG/1
50 TABLET ORAL EVERY 6 HOURS PRN
Status: DISCONTINUED | OUTPATIENT
Start: 2024-05-20 | End: 2024-05-23 | Stop reason: HOSPADM

## 2024-05-20 RX ORDER — SODIUM CHLORIDE 0.9 G/100ML
IRRIGANT IRRIGATION AS NEEDED
Status: DISCONTINUED | OUTPATIENT
Start: 2024-05-20 | End: 2024-05-20 | Stop reason: HOSPADM

## 2024-05-20 RX ORDER — ONDANSETRON HYDROCHLORIDE 2 MG/ML
4 INJECTION, SOLUTION INTRAVENOUS EVERY 6 HOURS PRN
Status: DISCONTINUED | OUTPATIENT
Start: 2024-05-20 | End: 2024-05-23 | Stop reason: HOSPADM

## 2024-05-20 RX ORDER — HYDROMORPHONE HYDROCHLORIDE 1 MG/ML
0.2 INJECTION, SOLUTION INTRAMUSCULAR; INTRAVENOUS; SUBCUTANEOUS EVERY 5 MIN PRN
Status: DISCONTINUED | OUTPATIENT
Start: 2024-05-20 | End: 2024-05-20 | Stop reason: HOSPADM

## 2024-05-20 RX ORDER — ONDANSETRON HYDROCHLORIDE 2 MG/ML
4 INJECTION, SOLUTION INTRAVENOUS ONCE AS NEEDED
Status: DISCONTINUED | OUTPATIENT
Start: 2024-05-20 | End: 2024-05-20 | Stop reason: HOSPADM

## 2024-05-20 RX ORDER — SODIUM CHLORIDE, SODIUM LACTATE, POTASSIUM CHLORIDE, CALCIUM CHLORIDE 600; 310; 30; 20 MG/100ML; MG/100ML; MG/100ML; MG/100ML
40 INJECTION, SOLUTION INTRAVENOUS CONTINUOUS
Status: DISCONTINUED | OUTPATIENT
Start: 2024-05-20 | End: 2024-05-23 | Stop reason: HOSPADM

## 2024-05-20 RX ORDER — CEFAZOLIN 1 G/1
INJECTION, POWDER, FOR SOLUTION INTRAVENOUS AS NEEDED
Status: DISCONTINUED | OUTPATIENT
Start: 2024-05-20 | End: 2024-05-20

## 2024-05-20 RX ORDER — SODIUM CHLORIDE 9 MG/ML
INJECTION, SOLUTION INTRAVENOUS
Status: DISPENSED
Start: 2024-05-20 | End: 2024-05-20

## 2024-05-20 RX ORDER — PHENYLEPHRINE HCL IN 0.9% NACL 0.4MG/10ML
SYRINGE (ML) INTRAVENOUS AS NEEDED
Status: DISCONTINUED | OUTPATIENT
Start: 2024-05-20 | End: 2024-05-20

## 2024-05-20 RX ORDER — WATER 1 ML/ML
IRRIGANT IRRIGATION AS NEEDED
Status: DISCONTINUED | OUTPATIENT
Start: 2024-05-20 | End: 2024-05-20 | Stop reason: HOSPADM

## 2024-05-20 RX ORDER — DROPERIDOL 2.5 MG/ML
0.62 INJECTION, SOLUTION INTRAMUSCULAR; INTRAVENOUS ONCE AS NEEDED
Status: DISCONTINUED | OUTPATIENT
Start: 2024-05-20 | End: 2024-05-20 | Stop reason: HOSPADM

## 2024-05-20 RX ORDER — SODIUM CHLORIDE, SODIUM LACTATE, POTASSIUM CHLORIDE, CALCIUM CHLORIDE 600; 310; 30; 20 MG/100ML; MG/100ML; MG/100ML; MG/100ML
100 INJECTION, SOLUTION INTRAVENOUS CONTINUOUS
Status: DISCONTINUED | OUTPATIENT
Start: 2024-05-20 | End: 2024-05-23 | Stop reason: HOSPADM

## 2024-05-20 RX ORDER — TRAMADOL HYDROCHLORIDE 50 MG/1
100 TABLET ORAL EVERY 6 HOURS PRN
Status: DISCONTINUED | OUTPATIENT
Start: 2024-05-20 | End: 2024-05-23 | Stop reason: HOSPADM

## 2024-05-20 RX ORDER — METRONIDAZOLE 500 MG/100ML
INJECTION, SOLUTION INTRAVENOUS AS NEEDED
Status: DISCONTINUED | OUTPATIENT
Start: 2024-05-20 | End: 2024-05-20

## 2024-05-20 RX ORDER — ACETAMINOPHEN 325 MG/1
650 TABLET ORAL EVERY 4 HOURS PRN
Status: DISCONTINUED | OUTPATIENT
Start: 2024-05-20 | End: 2024-05-20 | Stop reason: HOSPADM

## 2024-05-20 RX ORDER — LIDOCAINE HYDROCHLORIDE 20 MG/ML
INJECTION, SOLUTION INFILTRATION; PERINEURAL AS NEEDED
Status: DISCONTINUED | OUTPATIENT
Start: 2024-05-20 | End: 2024-05-20

## 2024-05-20 RX ORDER — PROPOFOL 10 MG/ML
INJECTION, EMULSION INTRAVENOUS AS NEEDED
Status: DISCONTINUED | OUTPATIENT
Start: 2024-05-20 | End: 2024-05-20

## 2024-05-20 RX ORDER — ROCURONIUM BROMIDE 10 MG/ML
INJECTION, SOLUTION INTRAVENOUS AS NEEDED
Status: DISCONTINUED | OUTPATIENT
Start: 2024-05-20 | End: 2024-05-20

## 2024-05-20 RX ORDER — GLYCOPYRROLATE 0.2 MG/ML
INJECTION INTRAMUSCULAR; INTRAVENOUS AS NEEDED
Status: DISCONTINUED | OUTPATIENT
Start: 2024-05-20 | End: 2024-05-20

## 2024-05-20 RX ADMIN — MISOPROSTOL 200 MCG: 200 TABLET ORAL at 08:00

## 2024-05-20 RX ADMIN — METOPROLOL TARTRATE 1 MG: 1 INJECTION, SOLUTION INTRAVENOUS at 14:22

## 2024-05-20 RX ADMIN — CEFAZOLIN 2 G: 1 INJECTION, POWDER, FOR SOLUTION INTRAMUSCULAR; INTRAVENOUS at 11:54

## 2024-05-20 RX ADMIN — ACETAMINOPHEN 975 MG: 325 TABLET ORAL at 19:21

## 2024-05-20 RX ADMIN — FENTANYL CITRATE 50 MCG: 50 INJECTION, SOLUTION INTRAMUSCULAR; INTRAVENOUS at 08:18

## 2024-05-20 RX ADMIN — HYDROMORPHONE HYDROCHLORIDE 0.5 MG: 1 INJECTION, SOLUTION INTRAMUSCULAR; INTRAVENOUS; SUBCUTANEOUS at 15:01

## 2024-05-20 RX ADMIN — HYDROMORPHONE HYDROCHLORIDE 0.5 MG: 1 INJECTION, SOLUTION INTRAMUSCULAR; INTRAVENOUS; SUBCUTANEOUS at 14:16

## 2024-05-20 RX ADMIN — SODIUM CHLORIDE 1000 ML: 900 IRRIGANT IRRIGATION at 10:46

## 2024-05-20 RX ADMIN — ROCURONIUM BROMIDE 10 MG: 10 INJECTION INTRAVENOUS at 09:56

## 2024-05-20 RX ADMIN — SODIUM CHLORIDE, SODIUM LACTATE, POTASSIUM CHLORIDE, CALCIUM CHLORIDE: 600; 310; 30; 20 INJECTION, SOLUTION INTRAVENOUS at 07:16

## 2024-05-20 RX ADMIN — GABAPENTIN 600 MG: 600 TABLET, FILM COATED ORAL at 06:40

## 2024-05-20 RX ADMIN — ALBUMIN HUMAN 250 ML: 0.05 INJECTION, SOLUTION INTRAVENOUS at 12:52

## 2024-05-20 RX ADMIN — ROCURONIUM BROMIDE 10 MG: 10 INJECTION INTRAVENOUS at 13:30

## 2024-05-20 RX ADMIN — LIDOCAINE HYDROCHLORIDE 60 MG: 20 INJECTION, SOLUTION INFILTRATION; PERINEURAL at 07:36

## 2024-05-20 RX ADMIN — MISOPROSTOL 200 MCG: 200 TABLET ORAL at 08:01

## 2024-05-20 RX ADMIN — Medication 6 L/MIN: at 14:57

## 2024-05-20 RX ADMIN — Medication 80 MCG: at 07:59

## 2024-05-20 RX ADMIN — PROPOFOL 20 MG: 10 INJECTION, EMULSION INTRAVENOUS at 07:41

## 2024-05-20 RX ADMIN — SIMETHICONE 80 MG: 80 TABLET, CHEWABLE ORAL at 19:22

## 2024-05-20 RX ADMIN — ROCURONIUM BROMIDE 10 MG: 10 INJECTION INTRAVENOUS at 08:35

## 2024-05-20 RX ADMIN — POLYETHYLENE GLYCOL 3350 17 G: 17 POWDER, FOR SOLUTION ORAL at 19:21

## 2024-05-20 RX ADMIN — ROCURONIUM BROMIDE 10 MG: 10 INJECTION INTRAVENOUS at 12:13

## 2024-05-20 RX ADMIN — MIDAZOLAM HYDROCHLORIDE 2 MG: 1 INJECTION, SOLUTION INTRAMUSCULAR; INTRAVENOUS at 07:17

## 2024-05-20 RX ADMIN — METOPROLOL TARTRATE 2 MG: 1 INJECTION, SOLUTION INTRAVENOUS at 14:41

## 2024-05-20 RX ADMIN — GLYCOPYRROLATE 0.2 MG: 0.2 INJECTION INTRAMUSCULAR; INTRAVENOUS at 09:39

## 2024-05-20 RX ADMIN — METOPROLOL TARTRATE 1 MG: 1 INJECTION, SOLUTION INTRAVENOUS at 14:31

## 2024-05-20 RX ADMIN — DEXAMETHASONE SODIUM PHOSPHATE 6 MG: 4 INJECTION INTRA-ARTICULAR; INTRALESIONAL; INTRAMUSCULAR; INTRAVENOUS; SOFT TISSUE at 08:01

## 2024-05-20 RX ADMIN — CELECOXIB 400 MG: 200 CAPSULE ORAL at 06:40

## 2024-05-20 RX ADMIN — ALBUTEROL SULFATE 2 PUFF: 90 AEROSOL, METERED RESPIRATORY (INHALATION) at 14:05

## 2024-05-20 RX ADMIN — ROCURONIUM BROMIDE 10 MG: 10 INJECTION INTRAVENOUS at 09:27

## 2024-05-20 RX ADMIN — ACETAMINOPHEN 975 MG: 325 TABLET ORAL at 06:39

## 2024-05-20 RX ADMIN — ROCURONIUM BROMIDE 10 MG: 10 INJECTION INTRAVENOUS at 11:31

## 2024-05-20 RX ADMIN — ALBUTEROL SULFATE 2 PUFF: 90 AEROSOL, METERED RESPIRATORY (INHALATION) at 07:41

## 2024-05-20 RX ADMIN — ROCURONIUM BROMIDE 60 MG: 10 INJECTION INTRAVENOUS at 07:38

## 2024-05-20 RX ADMIN — HYDROMORPHONE HYDROCHLORIDE 0.3 MG: 1 INJECTION, SOLUTION INTRAMUSCULAR; INTRAVENOUS; SUBCUTANEOUS at 10:38

## 2024-05-20 RX ADMIN — METOPROLOL TARTRATE 1 MG: 1 INJECTION, SOLUTION INTRAVENOUS at 14:25

## 2024-05-20 RX ADMIN — ROCURONIUM BROMIDE 10 MG: 10 INJECTION INTRAVENOUS at 10:22

## 2024-05-20 RX ADMIN — METRONIDAZOLE 500 MG: 500 INJECTION, SOLUTION INTRAVENOUS at 13:12

## 2024-05-20 RX ADMIN — ROCURONIUM BROMIDE 10 MG: 10 INJECTION INTRAVENOUS at 13:52

## 2024-05-20 RX ADMIN — Medication 1 APPLICATION: at 07:58

## 2024-05-20 RX ADMIN — SUGAMMADEX 200 MG: 100 INJECTION, SOLUTION INTRAVENOUS at 14:47

## 2024-05-20 RX ADMIN — ALBUMIN HUMAN 250 ML: 0.05 INJECTION, SOLUTION INTRAVENOUS at 10:24

## 2024-05-20 RX ADMIN — ROCURONIUM BROMIDE 10 MG: 10 INJECTION INTRAVENOUS at 10:58

## 2024-05-20 RX ADMIN — HYDROMORPHONE HYDROCHLORIDE 0.5 MG: 1 INJECTION, SOLUTION INTRAMUSCULAR; INTRAVENOUS; SUBCUTANEOUS at 13:34

## 2024-05-20 RX ADMIN — ALBUTEROL SULFATE 2 PUFF: 90 AEROSOL, METERED RESPIRATORY (INHALATION) at 07:42

## 2024-05-20 RX ADMIN — SODIUM CHLORIDE, POTASSIUM CHLORIDE, SODIUM LACTATE AND CALCIUM CHLORIDE: 600; 310; 30; 20 INJECTION, SOLUTION INTRAVENOUS at 08:02

## 2024-05-20 RX ADMIN — HYDROMORPHONE HYDROCHLORIDE 0.4 MG: 1 INJECTION, SOLUTION INTRAMUSCULAR; INTRAVENOUS; SUBCUTANEOUS at 16:00

## 2024-05-20 RX ADMIN — PROPOFOL 30 MG: 10 INJECTION, EMULSION INTRAVENOUS at 14:29

## 2024-05-20 RX ADMIN — ONDANSETRON 4 MG: 2 INJECTION INTRAMUSCULAR; INTRAVENOUS at 14:04

## 2024-05-20 RX ADMIN — ROCURONIUM BROMIDE 10 MG: 10 INJECTION INTRAVENOUS at 12:40

## 2024-05-20 RX ADMIN — FENTANYL CITRATE 50 MCG: 50 INJECTION, SOLUTION INTRAMUSCULAR; INTRAVENOUS at 07:36

## 2024-05-20 RX ADMIN — SODIUM CHLORIDE, POTASSIUM CHLORIDE, SODIUM LACTATE AND CALCIUM CHLORIDE: 600; 310; 30; 20 INJECTION, SOLUTION INTRAVENOUS at 12:40

## 2024-05-20 RX ADMIN — LIDOCAINE HYDROCHLORIDE 40 MG: 20 INJECTION, SOLUTION INFILTRATION; PERINEURAL at 07:37

## 2024-05-20 RX ADMIN — WATER 1000 ML: 100 IRRIGANT IRRIGATION at 09:15

## 2024-05-20 RX ADMIN — SODIUM CHLORIDE, POTASSIUM CHLORIDE, SODIUM LACTATE AND CALCIUM CHLORIDE 100 ML/HR: 600; 310; 30; 20 INJECTION, SOLUTION INTRAVENOUS at 16:05

## 2024-05-20 RX ADMIN — PROPOFOL 150 MG: 10 INJECTION, EMULSION INTRAVENOUS at 07:37

## 2024-05-20 RX ADMIN — VASOPRESSIN 20 UNITS: 20 INJECTION, SOLUTION INTRAMUSCULAR; SUBCUTANEOUS at 10:28

## 2024-05-20 RX ADMIN — KETOROLAC TROMETHAMINE 30 MG: 30 INJECTION, SOLUTION INTRAMUSCULAR; INTRAVENOUS at 19:22

## 2024-05-20 RX ADMIN — ROCURONIUM BROMIDE 10 MG: 10 INJECTION INTRAVENOUS at 13:12

## 2024-05-20 RX ADMIN — Medication 80 MCG: at 11:02

## 2024-05-20 RX ADMIN — Medication 40 MCG: at 10:16

## 2024-05-20 RX ADMIN — ROCURONIUM BROMIDE 10 MG: 10 INJECTION INTRAVENOUS at 09:08

## 2024-05-20 RX ADMIN — HYDROMORPHONE HYDROCHLORIDE 0.2 MG: 1 INJECTION, SOLUTION INTRAMUSCULAR; INTRAVENOUS; SUBCUTANEOUS at 10:27

## 2024-05-20 RX ADMIN — CEFAZOLIN 2 G: 1 INJECTION, POWDER, FOR SOLUTION INTRAMUSCULAR; INTRAVENOUS at 08:00

## 2024-05-20 RX ADMIN — METHOCARBAMOL 1000 MG: 100 INJECTION INTRAMUSCULAR; INTRAVENOUS at 15:25

## 2024-05-20 RX ADMIN — ALBUTEROL SULFATE 2 PUFF: 90 AEROSOL, METERED RESPIRATORY (INHALATION) at 14:04

## 2024-05-20 SDOH — HEALTH STABILITY: MENTAL HEALTH: CURRENT SMOKER: 1

## 2024-05-20 ASSESSMENT — PAIN SCALES - GENERAL
PAINLEVEL_OUTOF10: 7
PAINLEVEL_OUTOF10: 0 - NO PAIN
PAINLEVEL_OUTOF10: 5 - MODERATE PAIN
PAINLEVEL_OUTOF10: 0 - NO PAIN
PAINLEVEL_OUTOF10: 7
PAINLEVEL_OUTOF10: 0 - NO PAIN
PAINLEVEL_OUTOF10: 4
PAINLEVEL_OUTOF10: 0 - NO PAIN

## 2024-05-20 ASSESSMENT — COGNITIVE AND FUNCTIONAL STATUS - GENERAL
PATIENT BASELINE BEDBOUND: NO
MOBILITY SCORE: 24
DAILY ACTIVITIY SCORE: 24

## 2024-05-20 ASSESSMENT — ACTIVITIES OF DAILY LIVING (ADL)
ADEQUATE_TO_COMPLETE_ADL: YES
BATHING: INDEPENDENT
GROOMING: INDEPENDENT
DRESSING YOURSELF: INDEPENDENT
FEEDING YOURSELF: INDEPENDENT
JUDGMENT_ADEQUATE_SAFELY_COMPLETE_DAILY_ACTIVITIES: YES
PATIENT'S MEMORY ADEQUATE TO SAFELY COMPLETE DAILY ACTIVITIES?: YES
HEARING - RIGHT EAR: FUNCTIONAL
WALKS IN HOME: INDEPENDENT
HEARING - LEFT EAR: FUNCTIONAL
TOILETING: INDEPENDENT

## 2024-05-20 ASSESSMENT — PAIN - FUNCTIONAL ASSESSMENT
PAIN_FUNCTIONAL_ASSESSMENT: 0-10
PAIN_FUNCTIONAL_ASSESSMENT: VAS (VISUAL ANALOG SCALE)
PAIN_FUNCTIONAL_ASSESSMENT: 0-10
PAIN_FUNCTIONAL_ASSESSMENT: VAS (VISUAL ANALOG SCALE)
PAIN_FUNCTIONAL_ASSESSMENT: 0-10
PAIN_FUNCTIONAL_ASSESSMENT: VAS (VISUAL ANALOG SCALE)
PAIN_FUNCTIONAL_ASSESSMENT: 0-10

## 2024-05-20 ASSESSMENT — PAIN DESCRIPTION - ORIENTATION
ORIENTATION: MID
ORIENTATION: LOWER;MID

## 2024-05-20 ASSESSMENT — PAIN DESCRIPTION - DESCRIPTORS: DESCRIPTORS: SORE

## 2024-05-20 ASSESSMENT — COLUMBIA-SUICIDE SEVERITY RATING SCALE - C-SSRS
1. IN THE PAST MONTH, HAVE YOU WISHED YOU WERE DEAD OR WISHED YOU COULD GO TO SLEEP AND NOT WAKE UP?: NO
2. HAVE YOU ACTUALLY HAD ANY THOUGHTS OF KILLING YOURSELF?: NO
6. HAVE YOU EVER DONE ANYTHING, STARTED TO DO ANYTHING, OR PREPARED TO DO ANYTHING TO END YOUR LIFE?: NO

## 2024-05-20 ASSESSMENT — PAIN DESCRIPTION - LOCATION: LOCATION: ABDOMEN

## 2024-05-20 NOTE — BRIEF OP NOTE
Date: 2024  OR Location: [facility] OR    Name: Daria Isaac, : 1990, Age: 33 y.o., MRN: [id number], Sex: female    Diagnosis  Pre-op Diagnosis     * Fibroid [D21.9]     * Endometriosis [N80.9]     * Cyst of ovary, unspecified laterality [N83.209] Post-op Diagnosis     * Fibroid [D21.9]     * Endometriosis [N80.9]     * Cyst of ovary, unspecified laterality [N83.209]     Procedures  Laparotomy, appendectomy     Surgeons      * Scarlett Park - Primary    Resident/Fellow/Other Assistant:  Surgeons and Role:     * Natanael Zhao MD - Assisting     * Raquel Mckenzie MD - Resident - Assisting     * Oliva Guerra MD - Resident - Assisting    Procedure Summary  Anesthesia: General  ASA: II  Anesthesia Staff: Anesthesiologist: Elsa Aden MD  CRNA: ALEXANDRU Landers-CRNA; ALEXANDRU Morales-CRNA  Estimated Blood Loss: 5mL  Intra-op Medications:   Administrations occurring from 0715 to 1115 on 24:   Medication Name Total Dose   surgical lubricant gel 1 Application   sodium chloride 0.9 % irrigation solution 1,000 mL   miSOPROStoL (Cytotec) tablet  - Omnicell Override Pull 200 mcg   miSOPROStoL (Cytotec) tablet  - Omnicell Override Pull 200 mcg   sterile water irrigation solution 1,000 mL   vasopressin (Vasostrict) injection  - Omnicell Override Pull 20 Units              Anesthesia Record               Intraprocedure I/O Totals          Intake    PRBC 350.00 mL    LR infusion 500.00 mL    lactated Ringer's 1000.00 mL    Total Intake 1850 mL       Output    Urine 300 mL    Est. Blood Loss 650 mL    Total Output 950 mL       Net    Net Volume 900 mL          Specimen:   ID Type Source Tests Collected by Time   1 : left pelvic side wall Tissue PELVIC SIDE WALL EXCISION SURGICAL PATHOLOGY EXAM Scarlett Park MD 2024 1141   2 : Left Fallopian tube Tissue FALLOPIAN TUBE SALPINGECTOMY LEFT SURGICAL PATHOLOGY EXAM Scarlett Park MD 2024 1150   3 : right pelvic side wall Tissue  PELVIC LYMPH NODE EXCISION RIGHT SURGICAL PATHOLOGY EXAM Scarlett Park MD 5/20/2024 1232   4 : rectal nodule Tissue SOFT TISSUE RESECTION SURGICAL PATHOLOGY EXAM Scarlett Park MD 5/20/2024 1236   5 : Uterine Fibroid Tissue FIBROID MYOMECTOMY SURGICAL PATHOLOGY EXAM Scarlett Park MD 5/20/2024 1341   6 : Ovarian cyst wall Tissue CYST OVARY SURGICAL PATHOLOGY EXAM Scarlett Park MD 5/20/2024 1342   7 : Appendix Tissue APPENDIX SURGICAL PATHOLOGY EXAM Scarlett Park MD 5/20/2024 1349        Staff:   Circulator: Cameron Britt, RN; Scarlett Ellis, RN; Valentín Morgan, ZOE; Mana Beach RN  Relief Circulator: Camden Busby RN  Relief Scrub: Jonnathan Wooten RN  Scrub Person: Emeterio George; Derrick Vega RN          Findings: Thicken scarred appendix without obvious inherent pathology. Cecum intact and normal. Case returned to gyn team    Complications:  None; patient tolerated the procedure well.     Disposition:  Rest of case per gyn   Condition: stable  Specimens Collected:   ID Type Source Tests Collected by Time   1 : left pelvic side wall Tissue PELVIC SIDE WALL EXCISION SURGICAL PATHOLOGY EXAM Scarlett Park MD 5/20/2024 1141   2 : Left Fallopian tube Tissue FALLOPIAN TUBE SALPINGECTOMY LEFT SURGICAL PATHOLOGY EXAM Scarlett Park MD 5/20/2024 1150   3 : right pelvic side wall Tissue PELVIC LYMPH NODE EXCISION RIGHT SURGICAL PATHOLOGY EXAM Scarlett Park MD 5/20/2024 1232   4 : rectal nodule Tissue SOFT TISSUE RESECTION SURGICAL PATHOLOGY EXAM Scarlett Park MD 5/20/2024 1236   5 : Uterine Fibroid Tissue FIBROID MYOMECTOMY SURGICAL PATHOLOGY EXAM Scarlett Park MD 5/20/2024 1341   6 : Ovarian cyst wall Tissue CYST OVARY SURGICAL PATHOLOGY EXAM Scarlett Park MD 5/20/2024 1342   7 : Appendix Tissue APPENDIX SURGICAL PATHOLOGY EXAM Scarlett Park MD 5/20/2024 1349     Attending Attestation:     Natanael Zhao MD

## 2024-05-20 NOTE — H&P
History Of Present Illness  Daria Isaac is a 33 y.o. female presenting for robotic myomectomy, ovarian cystectomy, excision of endometriosis, possible appendectomy, any other indicated procedure, in the setting of suspected endometriosis and fibroid uterus.    Pre op labs: Hgb 8.8, plts 407, Cr 0.69.  46    Pre op imaging:  MRI  IMPRESSION:  1. 9.1 cm fibroid within the uterus as above.  2. 11.3 cm cystic structure along the right side of the pelvis  superiorly thought to arise from the right ovary with a beak like  configuration of the caudal aspect of this cyst, contiguous with the  right ovarian tissue. Serous ovarian cystadenoma is a likely  consideration with a malignant etiology thought to be less likely due  to the absence of ascites and the smooth wall of the cyst without  mural nodularity or excrescences.  3. Probable paraovarian cyst within the midline, separate from the  right and left ovaries. This measures up to 3.4 cm.    Past Medical History  Past Medical History:   Diagnosis Date    Adnexal mass     Anemia     Cyst of ovary, right     Dysfunctional uterine bleeding     Endometriosis     Fibroid uterus     Recurrent pregnancy loss        Surgical History  Past Surgical History:   Procedure Laterality Date    NO PAST SURGERIES          Social History  She reports that she has quit smoking. Her smoking use included cigarettes. She has quit using smokeless tobacco. She reports that she does not currently use alcohol after a past usage of about 2.0 standard drinks of alcohol per week. She reports that she does not use drugs.    Family History  Family History   Problem Relation Name Age of Onset    Fibroids Mother      Endometriosis Mother      Hypertension Mother          Allergies  Bee pollen      Physical Exam  Gen: well appearing, no distress  HEENT: normocephalic   Pulm: normal work of breathing on room air  CV: warm, well perfused  GI: abdomen soft, non tender, non distended  : deferred  "to OR  MSK: normal range of motion  Ext: no LE edema     Last Recorded Vitals  Blood pressure (!) 156/91, pulse 60, temperature 36.2 °C (97.2 °F), temperature source Temporal, resp. rate 16, height 1.778 m (5' 10\"), weight 111 kg (245 lb 2.4 oz), last menstrual period 05/20/2024, SpO2 98%.    Raquel Mckenzie MD    "

## 2024-05-20 NOTE — ANESTHESIA POSTPROCEDURE EVALUATION
Patient: Daria Isaac    Procedure Summary       Date: 05/20/24 Room / Location: [facility] OR 05 / [facility]    Anesthesia Start: 0716 Anesthesia Stop: 1507    Procedures:       Robotic myomectomy, ovarian cystectomy, left salpingectomy, lysis of adhesions      Appendectomy Diagnosis:       Fibroid      Endometriosis      Cyst of ovary, unspecified laterality      (Fibroid [D21.9])      (Endometriosis [N80.9])      (Cyst of ovary, unspecified laterality [N83.209])    Surgeons: Scarlett Park MD Responsible Provider: Elsa Aden MD    Anesthesia Type: general ASA Status: 2            Anesthesia Type: general    Vitals Value Taken Time   /93 05/20/24 1505   Temp 36 °C (96.8 °F) 05/20/24 1505   Pulse 93 05/20/24 1505   Resp 16 05/20/24 1505   SpO2 100 % 05/20/24 1505       Anesthesia Post Evaluation    Patient location during evaluation: PACU  Patient participation: complete - patient participated  Level of consciousness: awake  Pain management: adequate  Airway patency: patent  Cardiovascular status: acceptable  Respiratory status: acceptable  Hydration status: acceptable  Postoperative Nausea and Vomiting: none    There were no known notable events for this encounter.

## 2024-05-20 NOTE — OP NOTE
Robotic myomectomy, ovarian cystectomy, left salpingectomy, lysis of adhesions, Appendectomy Operative Note     Date: 2024  OR Location: [facility] OR    Name: Daria Isaac : 1990, Age: 33 y.o., MRN: [id number], Sex: female    Diagnosis  Pre-op Diagnosis     * Fibroid [D21.9]     * Endometriosis [N80.9]     * Cyst of ovary, unspecified laterality [N83.209] Post-op Diagnosis     * Fibroid [D21.9]     * Endometriosis [N80.9]     * Cyst of ovary, unspecified laterality [N83.209]     Procedures  Robotic myomectomy, ovarian cystectomy, left salpingectomy, lysis of adhesions  71271 - [place] LAPS MYOMECTOMY EXC 5/> MYOMAS >250 GRAMS    Appendectomy  Exploratory laparotomy     Surgeons      * Scarlett Park - Primary    Resident/Fellow/Other Assistant:  Surgeons and Role:     * Natanael Zhao MD - Assisting     * Raquel Mckenzie MD - Resident - Assisting     * Oliva Guerra MD - Resident - Assisting    Procedure Summary  Anesthesia: General  ASA: II  Anesthesia Staff: Anesthesiologist: Elsa Aden MD  CRNA: ALEXANDRU Landers-CRNA; DAVY Morales  Estimated Blood Loss: 700 mL  Intra-op Medications:   Administrations occurring from 0715 to 1115 on 24:   Medication Name Total Dose   surgical lubricant gel 1 Application   sodium chloride 0.9 % irrigation solution 1,000 mL   miSOPROStoL (Cytotec) tablet  - Omnicell Override Pull 200 mcg   miSOPROStoL (Cytotec) tablet  - Omnicell Override Pull 200 mcg   sterile water irrigation solution 1,000 mL   vasopressin (Vasostrict) injection  - Omnicell Override Pull 20 Units              Anesthesia Record               Intraprocedure I/O Totals          Intake    PRBC 350.00 mL    LR infusion 500.00 mL    lactated Ringer's 1000.00 mL    Total Intake 1850 mL       Output    Urine 300 mL    Est. Blood Loss 650 mL    Total Output 950 mL       Net    Net Volume 900 mL          Specimen:   ID Type Source Tests Collected by Time   1 : left pelvic side  wall Tissue PELVIC SIDE WALL EXCISION SURGICAL PATHOLOGY EXAM Scarlett Park MD 5/20/2024 1141   2 : Left Fallopian tube Tissue FALLOPIAN TUBE SALPINGECTOMY LEFT SURGICAL PATHOLOGY EXAM Scarlett Park MD 5/20/2024 1150   3 : right pelvic side wall Tissue PELVIC LYMPH NODE EXCISION RIGHT SURGICAL PATHOLOGY EXAM Scarlett Park MD 5/20/2024 1232   4 : rectal nodule Tissue SOFT TISSUE RESECTION SURGICAL PATHOLOGY EXAM Scarlett Park MD 5/20/2024 1236   5 : Uterine Fibroid Tissue FIBROID MYOMECTOMY SURGICAL PATHOLOGY EXAM Scarlett Park MD 5/20/2024 1341   6 : Ovarian cyst wall Tissue CYST OVARY SURGICAL PATHOLOGY EXAM Scarlett Park MD 5/20/2024 1342   7 : Appendix Tissue APPENDIX SURGICAL PATHOLOGY EXAM Scarlett Park MD 5/20/2024 1349        Staff:   Circulator: Cameron Britt, RN; Scarlett Ellis, RN; Valentín Morgan, ZOE; Mana Beach RN  Relief Circulator: Camden Busby RN  Relief Scrub: Jonnathan Wooten RN  Scrub Person: Emeterio George; Derrick Vega RN         Drains and/or Catheters:   Urethral Catheter 16 Fr. (Active)   Removed at end of case    Indications: Daria Isaac is an 33 y.o. female who is having surgery for Fibroid [D21.9]  Endometriosis [N80.9]  Cyst of ovary, unspecified laterality [N83.209].     Findings  Right Hemidiaphragm: normal, no evidence of endometriosis  Left Hemidiaphragm: normal, no evidence of endometriosis  Liver Edge: normal  Stomach Edge: normal     Large Intestines: Extensive adhesions to bilateral ovaries and uterus      Small Intestines: normal, no evidence of endometriosis  Appendix: adherent to R ovary and w/ significant distortion and scarring, broad base of appendix appearing to extend partially into ileum    Right Ovary: adherent to L ovary, uterus, and rectosigmoid colon, large cyst filled w/ purulent fluid  Right Fallopian Tube: retroperitonealized but following ELISE noted to have grossly normal fimbriated end  Left Ovary: adherent to R ovary,  uterus, rectosigmoid colon  Left Fallopian Tube: extensive scarring and dilation, no normal tissue appreciated   Uterus: initially could not be visualized due to omental adhesions overlying uterus and ovaries, posterior wall adherent to rectosigmoid colon/bilateral ovaries as above, large anterior fibroid  Chromopertubation: N/A     Right Pelvic Sidewall: adherent to R ovary/colon, obliterated  Right Uterosacral Ligament: obliterated  Left Pelvic Sidewall: adherent to L ovary/colon, obliterated   Left Uterosacral Ligament: obliterated  Posterior Cul De Sac: initially obliterated but once ovaries/sigmoid colon  grossly normal tissue appreciated   Bladder Peritoneum: no evidence of endometriosis     Description of Procedure  Patient was taken to the operating room where she was prepared and draped in the usual sterile fashion.  A uterine manipulator was placed.  A Rod catheter was placed. Attention was then turned abdominally.  The base of the umbilicus was elevated using Kocher clamps.  The skin was incised with a scalpel.  The fascia was grasped with Kochers and entered sharply using a scalpel.  Peritoneum was bluntly opened using a Katrin clamp.  Intraperitoneal placement was confirmed via visualization of underlying bowel.     Mercedes trocar was then placed at the umbilical entry site. Diagnostic laparoscopy was performed, and revealed findings as noted above.  No areas of trauma or injury were noted immediately inferior to the umbilicus.  The patient was placed in steep Trendelenburg positioning.  Pelvic findings were as noted above.       Ancillary trocars were then placed under direct visualization. Three robotic trocars and one 8 mm assistant port were used. The small intestine was run from the ileocecal junction to the level of the duodenum.  No abnormalities were noted.  The robot was then docked.    We began by lysing omental adhesions to better visualize ovaries/uterus. Once uterus could be  visualized, we injected dilute vasopressin between the fibroid capsule and overlying uterus. An incision was made vertically through the fundus/anterior wall of uterus w/ monopolar cautery and carried down to the level of the fibroid capsule. A combination of traction, blunt dissection, and monopolar were used to enucleate the fibroid. It was placed into the upper abdomen.     We then began lysing further adhesions between the ovaries/uterus/colon/appendix to re-establish normal anatomy. Bilateral ureters were identified and ureterolysis performed.    Once all structures had been , the cyst in the right ovary was incised, drained, and the cyst wall removed. The cyst wall was then placed in the anterior cul-de-sac. The ovary was re-approximated w/ running V-loc suture and the deep spaces closed.      The ovaries were pexied to the anterior abdominal wall using an 0 V-loc.  The posterior cul-de-sac was then inspected.  The pelvic sidewall peritoneum on the left was elevated at the pelvic brim away from underlying structures away from the ureter and iliac vasculature.  It was incised using monopolar electrosurgery.  The incision was carried parallel to the infundibulopelvic ligament, inferior to the ovarian stroma, and parallel to the uterus.  The peritoneum was then elevated away from the pelvic sidewall.  The ureter was identified and dissected.  The pelvic sidewall peritoneum was fully  from the retroperitoneal structures using a combination of monopolar electrosurgery and blunt dissection.  Full ureterolysis was completed to ensure that all peritoneum was being excised while a safe margin was being maintained from the ureter.  No areas of peritoneum were noted to remain on the left pelvic sidewall.     Attention was then turned to the right pelvic sidewall.  The peritoneum was excised in a manner identical to that completed on the left.  The posterior cul-de-sac and pelvic sidewalls were then  thoroughly suction irrigated.  Excellent hemostasis was noted and no areas of trauma were noted.     The pexied ovaries were released.  At this time, there was noted to be active bleeding from the significantly distorted left Fallopian tube. Given the degree of distortion and difficulty obtaining hemostasis, decision was made to perform left salpingectomy. The left Fallopian was  from the mesosalpinx and cornua and placed in the anterior cul-de-sac.      The pelvis was then thoroughly suction irrigated.  Cautery was used to obtain hemostasis.     The appendix was then examined. Given the broad base and overall significantly abnormal appearance of the appendix, Dr. Zhao was called in to evaluate, and decision was made to proceed w/ exploratory laparotomy. All instruments were then removed from the abdomen and pelvis.  The robot was undocked.    Dr. Zhao entered the abdomen, and upon entry, we removed the cyst wall and tube from the anterior cul-de-sac, and the fibroid from the upper abdomen. We then turned the case over to Dr. Zhao and his team. Please see operative note for details.     We were called back in for closure. The posterior cul-de-sac was again examined, and Floseal was placed throughout and pressure applied. Adequate hemostasis was noted following these procedures.     The fascia was then closed w/ a running suture of PDS. Skin and robotic incision sites were closed w/ Monocryl.     The uterine manipulator was then removed. The Rod catheter was removed. The patient was awakened from general anesthesia and taken the recovery room in stable condition.      I was present and scrubbed for the entirety of the surgical procedure. This was procedure was substantially more complicated and required increase time and surgical expertise due to extensive adhesive disease and distortion of normal anatomy, requiring lysis of adhesions greater than 1 hour.       Complications:  None; patient tolerated  the procedure well.    Disposition: PACU - hemodynamically stable.  Condition: stable       Scarlett Rudolphjeanconrado  Phone Number: [phone]

## 2024-05-20 NOTE — ANESTHESIA PROCEDURE NOTES
Peripheral IV  Date/Time: 5/20/2024 7:35 AM  Inserted by: ALEXANDRU Landers-TIM    Placement  Needle size: 18 G  Laterality: right  Location: wrist  Local anesthetic: none  Site prep: chlorhexidine  Technique: anatomical landmarks  Attempts: 1

## 2024-05-20 NOTE — ANESTHESIA PREPROCEDURE EVALUATION
Patient: Daria Isaac    Procedure Information       Date/Time: 05/20/24 0715    Procedure: Robotic myomectomy, ovarian cystectomy, excision of endometriosis, possible appendectomy, any indicated procedure    Location: [facility] OR 05 / [facility] OR    Surgeons: Scarlett Park MD            Relevant Problems   Anesthesia (within normal limits)  No previous anesthesia      Cardiac (within normal limits)      Pulmonary (within normal limits)  Smokes occasionally      Neuro (within normal limits)      GI (within normal limits)      /Renal (within normal limits)      Liver (within normal limits)      Hematology   (+) Anemia      Musculoskeletal (within normal limits)      HEENT (within normal limits)      ID (within normal limits)      Skin (within normal limits)      GYN   (+) Endometriosis   (+) Uterine fibroid     Vitals:    05/20/24 0601   BP: (!) 156/91   Pulse: 60   Resp: 16   Temp: 36.2 °C (97.2 °F)   SpO2: 98%         Clinical information reviewed:   Tobacco  Allergies  Meds   Med Hx  Surg Hx  OB Status  Fam Hx  Soc   Hx        NPO Detail:  NPO/Void Status  Carbohydrate Drink Given Prior to Surgery? : N  Date of Last Liquid: 05/20/24  Time of Last Liquid: 0000  Date of Last Solid: 05/20/24  Time of Last Solid: 0000  Last Intake Type: Clear fluids  Time of Last Void: 0600         Physical Exam    Airway  Mallampati: II  TM distance: >3 FB  Neck ROM: full  Comments: Can bite upper lip   Cardiovascular - normal exam  Rhythm: regular  Rate: normal     Dental    Pulmonary - normal exam     Abdominal          Anesthesia Plan    History of general anesthesia?: no  History of complications of general anesthesia?: no    ASA 2     general     The patient is a current smoker.  Patient did not smoke on day of procedure.    intravenous induction   Postoperative administration of opioids is intended.  Trial extubation is planned.  Anesthetic plan and risks discussed with patient and mother.  Use of blood  products discussed with patient and mother who consented to blood products.    Plan discussed with CRNA and attending.

## 2024-05-20 NOTE — ANESTHESIA PROCEDURE NOTES
Airway  Date/Time: 5/20/2024 7:41 AM  Urgency: elective    Airway not difficult    Staffing  Performed: CRNA   Authorized by: Elsa Aden MD    Performed by: ALEXANDRU Landers-TIM  Patient location during procedure: OR    Indications and Patient Condition  Indications for airway management: anesthesia  Spontaneous Ventilation: absent  Sedation level: deep  Preoxygenated: yes  Patient position: sniffing  MILS maintained throughout  Mask difficulty assessment: 1 - vent by mask  Planned trial extubation    Final Airway Details  Final airway type: endotracheal airway      Successful airway: ETT  Cuffed: yes   Successful intubation technique: direct laryngoscopy  Facilitating devices/methods: intubating stylet  Endotracheal tube insertion site: oral  Blade: Davie  Blade size: #3  ETT size (mm): 7.5  Cormack-Lehane Classification: grade I - full view of glottis  Placement verified by: chest auscultation and capnometry   Measured from: lips  ETT to lips (cm): 22  Number of attempts at approach: 2  Ventilation between attempts: BVM  Number of other approaches attempted: 1    Other Attempts  Unsuccessful attempted airways: endotracheal tube  Unsuccessful attempted endotracheal techniques: direct laryngoscopy    Additional Comments  First attempt, no ETCO2 on monitor, ETT removed, replaced with same technique. Still with minimal ETCO2 readings, wheezing in B lungs, low volumes, treated with albuterol for likely bronchospasm, ETCO2 wave improved, lung sounds improved

## 2024-05-21 LAB
ANION GAP SERPL CALC-SCNC: 11 MMOL/L (ref 10–20)
ANION GAP SERPL CALC-SCNC: 12 MMOL/L (ref 10–20)
APTT PPP: 24 SECONDS (ref 27–38)
BUN SERPL-MCNC: 10 MG/DL (ref 6–23)
BUN SERPL-MCNC: 12 MG/DL (ref 6–23)
CALCIUM SERPL-MCNC: 8 MG/DL (ref 8.6–10.6)
CALCIUM SERPL-MCNC: 8.1 MG/DL (ref 8.6–10.6)
CHLORIDE SERPL-SCNC: 101 MMOL/L (ref 98–107)
CHLORIDE SERPL-SCNC: 102 MMOL/L (ref 98–107)
CO2 SERPL-SCNC: 24 MMOL/L (ref 21–32)
CO2 SERPL-SCNC: 25 MMOL/L (ref 21–32)
CREAT SERPL-MCNC: 0.55 MG/DL (ref 0.5–1.05)
CREAT SERPL-MCNC: 0.77 MG/DL (ref 0.5–1.05)
EGFRCR SERPLBLD CKD-EPI 2021: >90 ML/MIN/1.73M*2
EGFRCR SERPLBLD CKD-EPI 2021: >90 ML/MIN/1.73M*2
ERYTHROCYTE [DISTWIDTH] IN BLOOD BY AUTOMATED COUNT: 16.2 % (ref 11.5–14.5)
ERYTHROCYTE [DISTWIDTH] IN BLOOD BY AUTOMATED COUNT: 16.4 % (ref 11.5–14.5)
GLUCOSE SERPL-MCNC: 115 MG/DL (ref 74–99)
GLUCOSE SERPL-MCNC: 138 MG/DL (ref 74–99)
HCT VFR BLD AUTO: 22.4 % (ref 36–46)
HCT VFR BLD AUTO: 24.7 % (ref 36–46)
HGB BLD-MCNC: 7.3 G/DL (ref 12–16)
HGB BLD-MCNC: 7.9 G/DL (ref 12–16)
INR PPP: 1.2 (ref 0.9–1.1)
MAGNESIUM SERPL-MCNC: 1.61 MG/DL (ref 1.6–2.4)
MCH RBC QN AUTO: 22 PG (ref 26–34)
MCH RBC QN AUTO: 22.5 PG (ref 26–34)
MCHC RBC AUTO-ENTMCNC: 32 G/DL (ref 32–36)
MCHC RBC AUTO-ENTMCNC: 32.6 G/DL (ref 32–36)
MCV RBC AUTO: 69 FL (ref 80–100)
MCV RBC AUTO: 69 FL (ref 80–100)
NRBC BLD-RTO: 0 /100 WBCS (ref 0–0)
NRBC BLD-RTO: 0 /100 WBCS (ref 0–0)
PLATELET # BLD AUTO: 306 X10*3/UL (ref 150–450)
PLATELET # BLD AUTO: 325 X10*3/UL (ref 150–450)
POTASSIUM SERPL-SCNC: 3.5 MMOL/L (ref 3.5–5.3)
POTASSIUM SERPL-SCNC: 4.4 MMOL/L (ref 3.5–5.3)
PROTHROMBIN TIME: 13.4 SECONDS (ref 9.8–12.8)
RBC # BLD AUTO: 3.25 X10*6/UL (ref 4–5.2)
RBC # BLD AUTO: 3.59 X10*6/UL (ref 4–5.2)
SODIUM SERPL-SCNC: 133 MMOL/L (ref 136–145)
SODIUM SERPL-SCNC: 134 MMOL/L (ref 136–145)
UFH PPP CHRO-ACNC: 0.1 IU/ML
UFH PPP CHRO-ACNC: 0.2 IU/ML
UFH PPP CHRO-ACNC: <0.1 IU/ML
WBC # BLD AUTO: 11.2 X10*3/UL (ref 4.4–11.3)
WBC # BLD AUTO: 7.3 X10*3/UL (ref 4.4–11.3)

## 2024-05-21 PROCEDURE — 80048 BASIC METABOLIC PNL TOTAL CA: CPT | Performed by: STUDENT IN AN ORGANIZED HEALTH CARE EDUCATION/TRAINING PROGRAM

## 2024-05-21 PROCEDURE — 2500000004 HC RX 250 GENERAL PHARMACY W/ HCPCS (ALT 636 FOR OP/ED): Performed by: STUDENT IN AN ORGANIZED HEALTH CARE EDUCATION/TRAINING PROGRAM

## 2024-05-21 PROCEDURE — 2500000004 HC RX 250 GENERAL PHARMACY W/ HCPCS (ALT 636 FOR OP/ED)

## 2024-05-21 PROCEDURE — 2500000001 HC RX 250 WO HCPCS SELF ADMINISTERED DRUGS (ALT 637 FOR MEDICARE OP): Performed by: STUDENT IN AN ORGANIZED HEALTH CARE EDUCATION/TRAINING PROGRAM

## 2024-05-21 PROCEDURE — 36415 COLL VENOUS BLD VENIPUNCTURE: CPT | Performed by: STUDENT IN AN ORGANIZED HEALTH CARE EDUCATION/TRAINING PROGRAM

## 2024-05-21 PROCEDURE — 36415 COLL VENOUS BLD VENIPUNCTURE: CPT

## 2024-05-21 PROCEDURE — 85610 PROTHROMBIN TIME: CPT

## 2024-05-21 PROCEDURE — 85520 HEPARIN ASSAY: CPT

## 2024-05-21 PROCEDURE — 85730 THROMBOPLASTIN TIME PARTIAL: CPT

## 2024-05-21 PROCEDURE — G0378 HOSPITAL OBSERVATION PER HR: HCPCS

## 2024-05-21 PROCEDURE — 99221 1ST HOSP IP/OBS SF/LOW 40: CPT | Performed by: INTERNAL MEDICINE

## 2024-05-21 PROCEDURE — 85027 COMPLETE CBC AUTOMATED: CPT | Performed by: STUDENT IN AN ORGANIZED HEALTH CARE EDUCATION/TRAINING PROGRAM

## 2024-05-21 PROCEDURE — 7100000011 HC EXTENDED STAY RECOVERY HOURLY - NURSING UNIT

## 2024-05-21 RX ORDER — HEPARIN SODIUM 10000 [USP'U]/100ML
0-4000 INJECTION, SOLUTION INTRAVENOUS CONTINUOUS
Status: DISCONTINUED | OUTPATIENT
Start: 2024-05-21 | End: 2024-05-23 | Stop reason: HOSPADM

## 2024-05-21 RX ADMIN — ACETAMINOPHEN 975 MG: 325 TABLET ORAL at 07:34

## 2024-05-21 RX ADMIN — SIMETHICONE 80 MG: 80 TABLET, CHEWABLE ORAL at 07:56

## 2024-05-21 RX ADMIN — KETOROLAC TROMETHAMINE 30 MG: 30 INJECTION, SOLUTION INTRAMUSCULAR; INTRAVENOUS at 07:34

## 2024-05-21 RX ADMIN — IRON SUCROSE 200 MG: 20 INJECTION, SOLUTION INTRAVENOUS at 23:51

## 2024-05-21 RX ADMIN — HEPARIN SODIUM 600 UNITS/HR: 10000 INJECTION, SOLUTION INTRAVENOUS at 16:16

## 2024-05-21 RX ADMIN — ACETAMINOPHEN 975 MG: 325 TABLET ORAL at 13:51

## 2024-05-21 RX ADMIN — ACETAMINOPHEN 975 MG: 325 TABLET ORAL at 20:42

## 2024-05-21 RX ADMIN — HEPARIN SODIUM 1000 UNITS/HR: 10000 INJECTION, SOLUTION INTRAVENOUS at 03:34

## 2024-05-21 RX ADMIN — POLYETHYLENE GLYCOL 3350 17 G: 17 POWDER, FOR SOLUTION ORAL at 07:56

## 2024-05-21 RX ADMIN — HEPARIN SODIUM 300 UNITS/HR: 10000 INJECTION, SOLUTION INTRAVENOUS at 09:36

## 2024-05-21 RX ADMIN — TRAMADOL HYDROCHLORIDE 50 MG: 50 TABLET, COATED ORAL at 21:31

## 2024-05-21 RX ADMIN — ACETAMINOPHEN 975 MG: 325 TABLET ORAL at 01:29

## 2024-05-21 RX ADMIN — KETOROLAC TROMETHAMINE 30 MG: 30 INJECTION, SOLUTION INTRAMUSCULAR; INTRAVENOUS at 01:30

## 2024-05-21 RX ADMIN — TRAMADOL HYDROCHLORIDE 50 MG: 50 TABLET, COATED ORAL at 07:56

## 2024-05-21 SDOH — SOCIAL STABILITY: SOCIAL INSECURITY: HAS ANYONE EVER THREATENED TO HURT YOUR FAMILY OR YOUR PETS?: NO

## 2024-05-21 SDOH — SOCIAL STABILITY: SOCIAL INSECURITY: ARE YOU OR HAVE YOU BEEN THREATENED OR ABUSED PHYSICALLY, EMOTIONALLY, OR SEXUALLY BY ANYONE?: NO

## 2024-05-21 SDOH — SOCIAL STABILITY: SOCIAL INSECURITY: HAVE YOU HAD ANY THOUGHTS OF HARMING ANYONE ELSE?: NO

## 2024-05-21 SDOH — SOCIAL STABILITY: SOCIAL INSECURITY: HAVE YOU HAD THOUGHTS OF HARMING ANYONE ELSE?: NO

## 2024-05-21 SDOH — SOCIAL STABILITY: SOCIAL INSECURITY: DO YOU FEEL ANYONE HAS EXPLOITED OR TAKEN ADVANTAGE OF YOU FINANCIALLY OR OF YOUR PERSONAL PROPERTY?: NO

## 2024-05-21 SDOH — SOCIAL STABILITY: SOCIAL INSECURITY: DO YOU FEEL UNSAFE GOING BACK TO THE PLACE WHERE YOU ARE LIVING?: NO

## 2024-05-21 SDOH — SOCIAL STABILITY: SOCIAL INSECURITY: ABUSE: ADULT

## 2024-05-21 SDOH — SOCIAL STABILITY: SOCIAL INSECURITY: ARE THERE ANY APPARENT SIGNS OF INJURIES/BEHAVIORS THAT COULD BE RELATED TO ABUSE/NEGLECT?: NO

## 2024-05-21 SDOH — SOCIAL STABILITY: SOCIAL INSECURITY: WERE YOU ABLE TO COMPLETE ALL THE BEHAVIORAL HEALTH SCREENINGS?: YES

## 2024-05-21 SDOH — SOCIAL STABILITY: SOCIAL INSECURITY: DOES ANYONE TRY TO KEEP YOU FROM HAVING/CONTACTING OTHER FRIENDS OR DOING THINGS OUTSIDE YOUR HOME?: NO

## 2024-05-21 ASSESSMENT — PAIN SCALES - GENERAL
PAINLEVEL_OUTOF10: 5 - MODERATE PAIN
PAINLEVEL_OUTOF10: 7
PAINLEVEL_OUTOF10: 3
PAINLEVEL_OUTOF10: 6
PAINLEVEL_OUTOF10: 7
PAINLEVEL_OUTOF10: 2
PAINLEVEL_OUTOF10: 4
PAINLEVEL_OUTOF10: 5 - MODERATE PAIN
PAINLEVEL_OUTOF10: 5 - MODERATE PAIN

## 2024-05-21 ASSESSMENT — PATIENT HEALTH QUESTIONNAIRE - PHQ9
1. LITTLE INTEREST OR PLEASURE IN DOING THINGS: NOT AT ALL
2. FEELING DOWN, DEPRESSED OR HOPELESS: NOT AT ALL
SUM OF ALL RESPONSES TO PHQ9 QUESTIONS 1 & 2: 0

## 2024-05-21 ASSESSMENT — PAIN - FUNCTIONAL ASSESSMENT
PAIN_FUNCTIONAL_ASSESSMENT: 0-10

## 2024-05-21 ASSESSMENT — PAIN DESCRIPTION - DESCRIPTORS
DESCRIPTORS: SORE;DISCOMFORT
DESCRIPTORS: SORE
DESCRIPTORS: SORE;DISCOMFORT
DESCRIPTORS: ACHING;THROBBING

## 2024-05-21 ASSESSMENT — PAIN DESCRIPTION - LOCATION: LOCATION: ABDOMEN

## 2024-05-21 NOTE — INDIVIDUALIZED OVERALL PLAN OF CARE NOTE
"Post-op check     S: Pt is doing okay. Pain is controlled with pain regimen. Tolerating soup and sips of water. No vaginal bleeding. Has tingling sensation in left toes with mild calf soreness, denies SOB and CP. Ambulating and spontaneously voiding.     O:  ./82   Pulse 79   Temp 37 °C (98.6 °F) (Temporal)   Resp 18   Ht 1.778 m (5' 10\")   Wt 111 kg (245 lb 2.4 oz)   LMP 05/20/2024   SpO2 99%   BMI 35.18 kg/m²       .General: no acute distress   HEENT: normocephalic, atraumatic   Cards: RRR   Pulm: lungs CTAB   Abdomen: soft, appropriate tenderness, midline incision covered in dressing with moderate shadowing noted, active bowel sounds auscultated  Extremities: left toe tingling sensation, mild calf soreness, no right calf tenderness endorsed  Neuro: no focal deficits     .  Results for orders placed or performed during the hospital encounter of 05/20/24 (from the past 24 hour(s))   Prepare RBC: 1 Units   Result Value Ref Range    PRODUCT CODE J2541I47     Unit Number [id number]     Unit ABO A     Unit RH POS     XM INTEP COMP     Dispense Status XM     Blood Expiration Date May 31, 2024 23:59 EDT     PRODUCT BLOOD TYPE 6200     UNIT VOLUME 350    POCT pregnancy, urine manually resulted   Result Value Ref Range    Preg Test, Ur Negative Negative   Prepare RBC: 2 Units   Result Value Ref Range    PRODUCT CODE V2717O61     Unit Number [id number]     Unit ABO A     Unit RH POS     XM INTEP COMP     Dispense Status XM     Blood Expiration Date May 31, 2024 23:59 EDT     PRODUCT BLOOD TYPE 6200     UNIT VOLUME 350     PRODUCT CODE Q9768I47     Unit Number [id number]     Unit ABO A     Unit RH POS     XM INTEP COMP     Dispense Status TR     Blood Expiration Date May 30, 2024 23:59 EDT     PRODUCT BLOOD TYPE 6200     UNIT VOLUME 350    Verify ABO/Rh Group Test (VERAB)   Result Value Ref Range    ABO TYPE A     Rh TYPE POS    CBC   Result Value Ref Range    WBC 7.3 4.4 - 11.3 x10*3/uL    nRBC " 0.0 0.0 - 0.0 /100 WBCs    RBC 3.59 (L) 4.00 - 5.20 x10*6/uL    Hemoglobin 7.9 (L) 12.0 - 16.0 g/dL    Hematocrit 24.7 (L) 36.0 - 46.0 %    MCV 69 (L) 80 - 100 fL    MCH 22.0 (L) 26.0 - 34.0 pg    MCHC 32.0 32.0 - 36.0 g/dL    RDW 16.2 (H) 11.5 - 14.5 %    Platelets 325 150 - 450 x10*3/uL   Basic Metabolic Panel   Result Value Ref Range    Glucose 138 (H) 74 - 99 mg/dL    Sodium 134 (L) 136 - 145 mmol/L    Potassium 4.4 3.5 - 5.3 mmol/L    Chloride 102 98 - 107 mmol/L    Bicarbonate 25 21 - 32 mmol/L    Anion Gap 11 10 - 20 mmol/L    Urea Nitrogen 10 6 - 23 mg/dL    Creatinine 0.55 0.50 - 1.05 mg/dL    eGFR >90 >60 mL/min/1.73m*2    Calcium 8.0 (L) 8.6 - 10.6 mg/dL   Magnesium   Result Value Ref Range    Magnesium 1.61 1.60 - 2.40 mg/dL   aPTT - baseline   Result Value Ref Range    aPTT 24 (L) 27 - 38 seconds   Protime-INR   Result Value Ref Range    Protime 13.4 (H) 9.8 - 12.8 seconds    INR 1.2 (H) 0.9 - 1.1         A/P  33 y.o who is POD#1 s/p Robotic myomectomy, ovarian cystectomy, left salpingectomy, lysis of adhesions, open appendectomy    Right Posterior Tibial DVT   -post-operative lower extremity soreness with DVT noted on Duplex US   -Given risk of bleeding, plan for low-intensity heparin gtt (will defer bolus)   - Vas medicine cs. Recs appreciated.       .Post-operative State  - Pain controlled on current regimen  - Rod removed in OR, voiding spontaneously   - Regular diet, advance as tolerated  - Anti-emetics and bowel regimen ordered   - LR 40cc/hr for maintenance fluids  - Strict I/Os. UOP   - On low-intensity heparin gtt (see above)  - Encourage incentive spirometry 10x/hr       Acute Blood Loss Anemia   - Admission hgb 8.8-> 1u pRBC intra-op ,  -> POD#0 hgb 7.9, patient is likely equilibrating possibly will need additional RBC in the AM.  Goal Hgb >7. AM labs pending   - vitals wnl, asx; continue to monitor       To be d/w Gyn team,  Rand Waller MD, PGY-2

## 2024-05-21 NOTE — CONSULTS
Consults  History Of Present Illness:        Daria Isaac is a 33 y.o. female presenting for robotic myomectomy, ovarian cystectomy, excision of endometriosis, possible appendectomy, any other indicated procedure, in the setting of suspected endometriosis and fibroid uterus.     Pre op labs: Hgb 8.8, plts 407, Cr 0.69.  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  she reported standing anywhere between 8 to 10 hours daily does not wear any compression stockings, has evidence of venous insufficiency although no prominent varicose veins patient has lower extremity edema +1 bilaterally, denies being on any current hormonal treatment patient mentioned that she is on iron pills for chronic anemia secondary most probably to dysfunctional uterine bleeding  Last Recorded Vitals:  Vitals:    05/20/24 1951 05/21/24 0141 05/21/24 0356 05/21/24 0738   BP: 121/82 101/68 95/65 106/72   Pulse: 79 89 104 (!) 112   Resp: 18 18 18 20   Temp: 37 °C (98.6 °F) 36.9 °C (98.4 °F) 37.1 °C (98.8 °F) 36.2 °C (97.2 °F)   TempSrc: Temporal Temporal Temporal Temporal   SpO2: 99% 98% 97% 96%   Weight:       Height:           Last Labs:  CBC - 5/20/2024: 10:41 PM  7.3 7.9 325    24.7      CMP - 5/21/2024:  5:43 AM  8.1 8.6 29 --- 0.7   _ 3.4 37 93      PTT - 5/21/2024:  1:55 AM  1.2   13.4 24     Hemoglobin A1C   Date/Time Value Ref Range Status   09/13/2023 06:41 AM 5.6 % Final     Comment:          Diagnosis of Diabetes-Adults   Non-Diabetic: < or = 5.6%   Increased risk for developing diabetes: 5.7-6.4%   Diagnostic of diabetes: > or = 6.5%  .       Monitoring of Diabetes                Age (y)     Therapeutic  "Goal (%)   Adults:          >18           <7.0   Pediatrics:    13-18           <7.5                   7-12           <8.0                   0- 6            7.5-8.5   American Diabetes Association. Diabetes Care 33(S1), Jan 2010.        Last I/O:  I/O last 3 completed shifts:  In: 2150 (19.3 mL/kg) [I.V.:1800 (16.2 mL/kg); Blood:350]  Out: 1100 (9.9 mL/kg) [Urine:450 (0.1 mL/kg/hr); Blood:650]  Weight: 111.2 kg     Past Cardiology Tests (Last 3 Years):  EKG:  No results found for this or any previous visit from the past 1095 days.    Echo:  No results found for this or any previous visit from the past 1095 days.    Ejection Fractions:  No results found for: \"EF\"  Cath:  No results found for this or any previous visit from the past 1095 days.    Stress Test:  No results found for this or any previous visit from the past 1095 days.    Cardiac Imaging:  No results found for this or any previous visit from the past 1095 days.      Past Medical History:  She has a past medical history of Adnexal mass, Anemia, Cyst of ovary, right, Dysfunctional uterine bleeding, Endometriosis, Fibroid uterus, and Recurrent pregnancy loss.    Past Surgical History:  She has a past surgical history that includes No past surgeries.      Social History:  She reports that she has quit smoking. Her smoking use included cigarettes. She has quit using smokeless tobacco. She reports that she does not currently use alcohol after a past usage of about 2.0 standard drinks of alcohol per week. She reports that she does not use drugs.    Family History:  Family History   Problem Relation Name Age of Onset    Fibroids Mother      Endometriosis Mother      Hypertension Mother          Allergies:  Bee pollen    Inpatient Medications:  Scheduled medications   Medication Dose Route Frequency    acetaminophen  975 mg oral q6h    ibuprofen  600 mg oral q6h    ketorolac  30 mg intravenous q6h    polyethylene glycol  17 g oral Daily     PRN medications "   Medication    HYDROmorphone    naloxone    ondansetron    simethicone    traMADol    traMADol     Continuous Medications   Medication Dose Last Rate    heparin  0-4,000 Units/hr 1,000 Units/hr (05/21/24 0334)    lactated Ringer's  100 mL/hr Stopped (05/20/24 1630)    lactated Ringer's  40 mL/hr       Outpatient Medications:  Current Outpatient Medications   Medication Instructions    acetaminophen (TYLENOL) 650 mg, oral, Every 6 hours PRN    drospirenone, contraceptive, 4 mg (28) tablet 1 tablet, oral, Daily    ferrous sulfate 325 (65 Fe) MG tablet TAKE 1 TABLET BY MOUTH TWICE DAILY WITH MEALS.    ibuprofen 600 mg, oral, Every 6 hours PRN       Physical Exam:  Gen: well appearing, no distress  HEENT: normocephalic   Pulm: normal work of breathing on room air  CV: warm, well perfused  GI: abdomen soft, non tender, non distended  : deferred to OR  MSK: normal range of motion  Ext: LE edema +1 right side more than left     Assessment/Plan   Daria Isaac is a 33 y.o. female presenting for robotic myomectomy, ovarian cystectomy, excision of endometriosis, possible appendectomy, any other indicated procedure, in the setting of suspected endometriosis and fibroid uterus.  Patient was newly diagnosed with what appears to be a provoked lower extremity DVT with signs and symptoms suggestive of chronic venous insufficiency  Recommendations  1-continue with heparin gtt. heparin assay of 0.3-0.7, continue to monitor hemoglobin and hematocrit for any evidence of bleeding although patient denies any history of overt bleeding denies any history of lower GI bleed upper GI bleed or hematuria  If patient is stable in regards to her hemoglobin and hematocrit then patient can be switched to a DOAC Eliquis initiating dose of 10 mg p.o. twice daily for 1 week then switch to 5 mg p.o. twice daily total duration of treatment is 3 months  2-patient will benefit from compression stockings 15 to 20 mmHg knee-high bilateral care  instructions were given to the patient  3-follow-up with vascular medicine clinic  4-patient will probably need venous insufficiency study to be done after completion of treatment for DVT  Site Assessment Clean;Intact;Dry 05/21/24 0738   Dressing Type Transparent 05/21/24 0738   Line Status Flushed;Infusing 05/21/24 0738   Dressing Status Dry;Clean;Occlusive 05/21/24 0738   Number of days: 1       Code Status:  Full Code    I spent 60 minutes in the professional and overall care of this patient.        Ar Burroughs MD

## 2024-05-21 NOTE — PROGRESS NOTES
Colorectal Surgery Progress Note      05/21/24    Daria Isaac      Subjective    Subjective:  Had complaint of BLE pain and swelling overnight. Per read, thrombus in R PT vein but no DVT in LLE. Abdominal pain is well controlled. Tolerated a regular diet without nausea or vomiting.     Objective      Objective:      Vital signs:   Vitals:    05/21/24 0738   BP: 106/72   Pulse: (!) 112   Resp: 20   Temp: 36.2 °C (97.2 °F)   SpO2: 96%        Physical Exam  GEN: No acute distress.   RESP: Breathing non-labored, equal chest rise.   CV: Tachycardic  GI: Abdomen soft, nondistended, appropriately tender for postoperative course. Infraumbilical midline incision covered with island dressing with moderate serosang strikethrough. X4 robotic port sites c/d/i  MSK: Moves all extremities spontaneously.  SKIN: No rashes.  NEURO: alert, conversive    I/O last 2 completed shifts:  In: 2150 (19.3 mL/kg) [I.V.:1800 (16.2 mL/kg); Blood:350]  Out: 1100 (9.9 mL/kg) [Urine:450 (0.2 mL/kg/hr); Blood:650]  Weight: 111.2 kg      Labs Past 18 Hours:  Recent Results (from the past 18 hour(s))   CBC    Collection Time: 05/20/24 10:41 PM   Result Value Ref Range    WBC 7.3 4.4 - 11.3 x10*3/uL    nRBC 0.0 0.0 - 0.0 /100 WBCs    RBC 3.59 (L) 4.00 - 5.20 x10*6/uL    Hemoglobin 7.9 (L) 12.0 - 16.0 g/dL    Hematocrit 24.7 (L) 36.0 - 46.0 %    MCV 69 (L) 80 - 100 fL    MCH 22.0 (L) 26.0 - 34.0 pg    MCHC 32.0 32.0 - 36.0 g/dL    RDW 16.2 (H) 11.5 - 14.5 %    Platelets 325 150 - 450 x10*3/uL   Basic Metabolic Panel    Collection Time: 05/20/24 10:41 PM   Result Value Ref Range    Glucose 138 (H) 74 - 99 mg/dL    Sodium 134 (L) 136 - 145 mmol/L    Potassium 4.4 3.5 - 5.3 mmol/L    Chloride 102 98 - 107 mmol/L    Bicarbonate 25 21 - 32 mmol/L    Anion Gap 11 10 - 20 mmol/L    Urea Nitrogen 10 6 - 23 mg/dL    Creatinine 0.55 0.50 - 1.05 mg/dL    eGFR >90 >60 mL/min/1.73m*2    Calcium 8.0 (L) 8.6 - 10.6 mg/dL   Magnesium    Collection Time:  05/20/24 10:41 PM   Result Value Ref Range    Magnesium 1.61 1.60 - 2.40 mg/dL   aPTT - baseline    Collection Time: 05/21/24  1:55 AM   Result Value Ref Range    aPTT 24 (L) 27 - 38 seconds   Protime-INR    Collection Time: 05/21/24  1:55 AM   Result Value Ref Range    Protime 13.4 (H) 9.8 - 12.8 seconds    INR 1.2 (H) 0.9 - 1.1   Basic metabolic panel    Collection Time: 05/21/24  5:43 AM   Result Value Ref Range    Glucose 115 (H) 74 - 99 mg/dL    Sodium 133 (L) 136 - 145 mmol/L    Potassium 3.5 3.5 - 5.3 mmol/L    Chloride 101 98 - 107 mmol/L    Bicarbonate 24 21 - 32 mmol/L    Anion Gap 12 10 - 20 mmol/L    Urea Nitrogen 12 6 - 23 mg/dL    Creatinine 0.77 0.50 - 1.05 mg/dL    eGFR >90 >60 mL/min/1.73m*2    Calcium 8.1 (L) 8.6 - 10.6 mg/dL   Heparin Assay, UFH    Collection Time: 05/21/24  7:51 AM   Result Value Ref Range    Heparin Unfractionated 0.2 See Comment Below for Therapeutic Ranges IU/mL        Meds:    Current Facility-Administered Medications:     acetaminophen (Tylenol) tablet 975 mg, 975 mg, oral, q6h, Raquel Mckenzie MD, 975 mg at 05/21/24 0734    heparin 25,000 Units in dextrose 5% 250 mL (100 Units/mL) infusion (premix), 0-4,000 Units/hr, intravenous, Continuous, Rand Waller MD, Last Rate: 10 mL/hr at 05/21/24 0334, 1,000 Units/hr at 05/21/24 0334    HYDROmorphone (Dilaudid) injection 0.4 mg, 0.4 mg, intravenous, q20 min PRN, Raquel Mckenzie MD    ibuprofen tablet 600 mg, 600 mg, oral, q6h, Raquel Mckenzie MD    ketorolac (Toradol) injection 30 mg, 30 mg, intravenous, q6h, Raquel Mckenzie MD, 30 mg at 05/21/24 0734    lactated Ringer's infusion, 100 mL/hr, intravenous, Continuous, Elsa Aden MD, Stopped at 05/20/24 1630    lactated Ringer's infusion, 40 mL/hr, intravenous, Continuous, Raquel Mckenzie MD    naloxone (Narcan) injection 0.1 mg, 0.1 mg, intravenous, q5 min PRN, Raquel Mckenzie MD    ondansetron (Zofran) injection 4 mg, 4 mg, intravenous, q6h PRN, Raquel Mckenzie MD    polyethylene glycol  (Glycolax, Miralax) packet 17 g, 17 g, oral, Daily, Raquel Mckenzie MD, 17 g at 05/21/24 0756    simethicone (Mylicon) chewable tablet 80 mg, 80 mg, oral, 4x daily PRN, Raquel Mckenzie MD, 80 mg at 05/21/24 0756    traMADol (Ultram) tablet 100 mg, 100 mg, oral, q6h PRN, Raquel Mckenzie MD    traMADol (Ultram) tablet 50 mg, 50 mg, oral, q6h PRN, Raquel Mckenzie MD, 50 mg at 05/21/24 0756     Imaging:  Vascular US lower extremity venous duplex bilateral    Result Date: 5/21/2024  Interpreted By:  Robert Paredes and Tavana Shahrzad STUDY: [facility] US LOWER EXTREMITY VENOUS DUPLEX BILATERAL;  5/20/2024 6:30 pm   INDICATION: Signs/Symptoms:bl le sswelling/knot.   COMPARISON: None.   ACCESSION NUMBER(S): [id number]   ORDERING CLINICIAN: BERTIN ZAMORA   TECHNIQUE: Vascular ultrasound of the bilateral lower extremities was performed. Real-time compression views as well as Gray scale, color Doppler and spectral Doppler waveform analysis was performed.   FINDINGS: Evaluation of the visualized portions of the bilateral common femoral vein, proximal, mid, and distal femoral vein, and popliteal vein were performed.  Evaluation of the visualized portions of the  posterior tibial and peroneal veins were also performed.   Limitations:  Suboptimal visualization of bilateral peroneal veins due to edema.   There is evidence of occlusive thrombosis of the right posterior tibial vein with associated intraluminal echogenic material. The vessel is not compressible. Otherwise, the remainder of the evaluated veins demonstrate normal compressibility. There is intact venous flow demonstrating normal respiratory variability and normal augmentation of flow with calf compression. Therefore, there is no ultrasonographic evidence for deep vein thrombosis within the evaluated veins.   Respiratory variation and augmentation to calf pressure was noted.       1. Occlusive thrombosis of the right posterior tibial vein. 2. No sonographic evidence for deep vein  thrombosis within the evaluated veins of the left lower extremity. However, please note that evaluation of bilateral peroneal veins was suboptimal due to edema.     I personally reviewed the images/study and I agree with the findings as stated by Dr. Erica Finnegan. The study was interpreted at [facility], [place], [place].   MACRO: Dr. Erica Finnegan discussed the significance and urgency of this critical finding by epic secure chat with Dr. Waller on 5/20/2024 at 7:38 pm.  (**-RCF-**) Findings:  See findings.   Signed by: Robert Paredes 5/21/2024 5:51 AM Dictation workstation:   [id number]       Assessment/Plan    Assessment and Plan:  Daria Isaac is a 32 yo F w/ fibroids, endometriosis s/p robo myomectomy, ovarian cystectomy, left salpingectomy (Gyn), appendectomy (CRS) on 5/20/24.    Recommendations:  - Care per primary team  - CRS will schedule outpatient follow up with Dr. Zhao in 3-4 weeks to discuss pathology results  - Please page with any questions or concerns    Discussed with attending physician, Dr. Zhao.    MD Mony HughesCHRISTUS St. Vincent Physicians [facility]  [phone]

## 2024-05-21 NOTE — CARE PLAN
Problem: Pain  Goal: My pain/discomfort is manageable  Outcome: Progressing     Problem: Safety  Goal: I will remain free of falls  Outcome: Progressing   The patient's goals for the shift include adequate pain control    The clinical goals for the shift include adequate pain control    Over the shift, the patient did make progress toward the following goals. Patient reported pain to be tolerable throughout shift.

## 2024-05-21 NOTE — SIGNIFICANT EVENT
POST OP CHECK NOTE  COLON AND RECTAL SURGERY    Daria Isaac  1990  [id number]    Subjective  Ms. Daria Dove a 33 y.o. female now POD 0 s/p Robotic myomectomy, ovarian cystectomy, left salpingectomy, lysis of adhesions (Gyn), laparotomy , appendectomy (CRS). She tolerated the procedure well. She was extubated at the end of the case.    Pain is currently well controlled on current pain regimen. No complaints of headaches, N/V, chest pain, SOB.     Drains : none  Rod:none    Objective  Vitals:    05/20/24 1951   BP: 121/82   Pulse: 79   Resp: 18   Temp: 37 °C (98.6 °F)   SpO2: 99%       Physical Exam:  General: laying bed, in NAD  HEENT: MMM, PERRL, iEOM  CV: RRR, capillary refill <2  Resp: breathing comfortably on room air  Abdomen/GI: infraumbilical midline incision covered with island dressing with moderate serosang strikethrough. X4 robotic port sites covered with large bandaids without any strikethrough  MSK: strength 5/5, R calf appears larger in size in comparision to LLE, 1+ pitting edema in RLE  Neuro: Aox3, no focal deficits, CN 1-12     Assessment/Plan:  Ms. Daria Dove a 33 y.o. female now POD 0 s/p Robotic myomectomy, ovarian cystectomy, left salpingectomy, lysis of adhesions (Gyn), laparotomy , appendectomy (CRS). They are recovering well. Post-operative duplex showed R PT DVT.    -pain control per primary team  - remainder of care per primary team.     CRS will continue to follow. Please reach out with any questions or concerns.     Will continue to monitor  overnight.     Fanta Turpin MD  PGY1 Colorectal Surgery  [facility] [phone]  [facility] [phone]  Available via Secure Chat

## 2024-05-21 NOTE — HOSPITAL COURSE
Patient underwent robotic myomectomy, ovarian cystectomy, L salpingectomy, lysis of adhesions and open appendectomy on 5/20. . Received 1u pRBC intra-op. Hgb pre op: 8.8, 1u intra-op, post op labs 7.9. AM --> 6.3. Received 1u pRBC and post transfusion labs showed hgb 7.5, appropriate.      Diagnosed with occlusive thrombosis of R posterior tibial vein. Vascular medicine consulted, hep gtt initiated and transitioned to Eliquis on POD 2. Will follow up with Vascular medicine  outpatient.     Meeting post op milestones at time of discharge. Will also follow up with CRS outpatient.

## 2024-05-22 ENCOUNTER — PHARMACY VISIT (OUTPATIENT)
Dept: PHARMACY | Facility: CLINIC | Age: 34
End: 2024-05-22
Payer: COMMERCIAL

## 2024-05-22 VITALS
BODY MASS INDEX: 35.1 KG/M2 | OXYGEN SATURATION: 97 % | SYSTOLIC BLOOD PRESSURE: 124 MMHG | DIASTOLIC BLOOD PRESSURE: 77 MMHG | RESPIRATION RATE: 18 BRPM | WEIGHT: 245.15 LBS | TEMPERATURE: 98.2 F | HEART RATE: 96 BPM | HEIGHT: 70 IN

## 2024-05-22 DIAGNOSIS — I82.409 POSTOPERATIVE ACUTE DEEP VEIN THROMBOSIS (DVT) OF LOWER EXTREMITY, INITIAL ENCOUNTER (MULTI): Primary | ICD-10-CM

## 2024-05-22 DIAGNOSIS — T81.72XA POSTOPERATIVE ACUTE DEEP VEIN THROMBOSIS (DVT) OF LOWER EXTREMITY, INITIAL ENCOUNTER (MULTI): Primary | ICD-10-CM

## 2024-05-22 LAB
ERYTHROCYTE [DISTWIDTH] IN BLOOD BY AUTOMATED COUNT: 16.8 % (ref 11.5–14.5)
ERYTHROCYTE [DISTWIDTH] IN BLOOD BY AUTOMATED COUNT: 17.5 % (ref 11.5–14.5)
HCT VFR BLD AUTO: 20.5 % (ref 36–46)
HCT VFR BLD AUTO: 24.8 % (ref 36–46)
HGB BLD-MCNC: 6.3 G/DL (ref 12–16)
HGB BLD-MCNC: 7.5 G/DL (ref 12–16)
MCH RBC QN AUTO: 22.4 PG (ref 26–34)
MCH RBC QN AUTO: 22.7 PG (ref 26–34)
MCHC RBC AUTO-ENTMCNC: 30.2 G/DL (ref 32–36)
MCHC RBC AUTO-ENTMCNC: 30.7 G/DL (ref 32–36)
MCV RBC AUTO: 73 FL (ref 80–100)
MCV RBC AUTO: 75 FL (ref 80–100)
NRBC BLD-RTO: 0 /100 WBCS (ref 0–0)
NRBC BLD-RTO: 0 /100 WBCS (ref 0–0)
PLATELET # BLD AUTO: 290 X10*3/UL (ref 150–450)
PLATELET # BLD AUTO: 308 X10*3/UL (ref 150–450)
RBC # BLD AUTO: 2.81 X10*6/UL (ref 4–5.2)
RBC # BLD AUTO: 3.3 X10*6/UL (ref 4–5.2)
UFH PPP CHRO-ACNC: 0.1 IU/ML
WBC # BLD AUTO: 12.2 X10*3/UL (ref 4.4–11.3)
WBC # BLD AUTO: 12.3 X10*3/UL (ref 4.4–11.3)

## 2024-05-22 PROCEDURE — 85027 COMPLETE CBC AUTOMATED: CPT | Performed by: STUDENT IN AN ORGANIZED HEALTH CARE EDUCATION/TRAINING PROGRAM

## 2024-05-22 PROCEDURE — P9016 RBC LEUKOCYTES REDUCED: HCPCS

## 2024-05-22 PROCEDURE — 85520 HEPARIN ASSAY: CPT

## 2024-05-22 PROCEDURE — 1210000001 HC SEMI-PRIVATE ROOM DAILY

## 2024-05-22 PROCEDURE — 36415 COLL VENOUS BLD VENIPUNCTURE: CPT

## 2024-05-22 PROCEDURE — 99233 SBSQ HOSP IP/OBS HIGH 50: CPT | Performed by: STUDENT IN AN ORGANIZED HEALTH CARE EDUCATION/TRAINING PROGRAM

## 2024-05-22 PROCEDURE — 85027 COMPLETE CBC AUTOMATED: CPT

## 2024-05-22 PROCEDURE — RXMED WILLOW AMBULATORY MEDICATION CHARGE

## 2024-05-22 PROCEDURE — 2500000004 HC RX 250 GENERAL PHARMACY W/ HCPCS (ALT 636 FOR OP/ED)

## 2024-05-22 PROCEDURE — 36430 TRANSFUSION BLD/BLD COMPNT: CPT

## 2024-05-22 RX ORDER — ONDANSETRON 4 MG/1
4 TABLET, FILM COATED ORAL EVERY 6 HOURS PRN
Qty: 20 TABLET | Refills: 0 | Status: SHIPPED | OUTPATIENT
Start: 2024-05-22 | End: 2024-05-29

## 2024-05-22 RX ORDER — ACETAMINOPHEN 325 MG/1
650 TABLET ORAL EVERY 6 HOURS PRN
Qty: 20 TABLET | Refills: 0 | Status: SHIPPED | OUTPATIENT
Start: 2024-05-22 | End: 2024-06-01

## 2024-05-22 RX ORDER — ASPIRIN 81 MG
100 TABLET, DELAYED RELEASE (ENTERIC COATED) ORAL 2 TIMES DAILY
Qty: 10 TABLET | Refills: 0 | Status: SHIPPED | OUTPATIENT
Start: 2024-05-22 | End: 2024-05-22

## 2024-05-22 RX ORDER — ONDANSETRON 4 MG/1
4 TABLET, FILM COATED ORAL EVERY 6 HOURS PRN
Qty: 20 TABLET | Refills: 0 | Status: SHIPPED | OUTPATIENT
Start: 2024-05-22 | End: 2024-05-22

## 2024-05-22 RX ORDER — OXYCODONE HYDROCHLORIDE 5 MG/1
5 TABLET ORAL EVERY 6 HOURS PRN
Qty: 12 TABLET | Refills: 0 | Status: SHIPPED | OUTPATIENT
Start: 2024-05-22 | End: 2024-05-22

## 2024-05-22 RX ORDER — OXYCODONE HYDROCHLORIDE 5 MG/1
5 TABLET ORAL EVERY 6 HOURS PRN
Qty: 12 TABLET | Refills: 0 | Status: SHIPPED | OUTPATIENT
Start: 2024-05-22 | End: 2024-05-29

## 2024-05-22 RX ORDER — ASPIRIN 81 MG
100 TABLET, DELAYED RELEASE (ENTERIC COATED) ORAL 2 TIMES DAILY
Qty: 10 TABLET | Refills: 0 | Status: SHIPPED | OUTPATIENT
Start: 2024-05-22 | End: 2024-05-27

## 2024-05-22 RX ADMIN — ACETAMINOPHEN 975 MG: 325 TABLET ORAL at 14:29

## 2024-05-22 RX ADMIN — ACETAMINOPHEN 975 MG: 325 TABLET ORAL at 08:35

## 2024-05-22 RX ADMIN — HEPARIN SODIUM 1000 UNITS/HR: 10000 INJECTION, SOLUTION INTRAVENOUS at 09:02

## 2024-05-22 RX ADMIN — ACETAMINOPHEN 975 MG: 325 TABLET ORAL at 02:29

## 2024-05-22 RX ADMIN — ACETAMINOPHEN 975 MG: 325 TABLET ORAL at 20:34

## 2024-05-22 ASSESSMENT — PAIN - FUNCTIONAL ASSESSMENT
PAIN_FUNCTIONAL_ASSESSMENT: 0-10

## 2024-05-22 ASSESSMENT — PAIN DESCRIPTION - DESCRIPTORS
DESCRIPTORS: SORE;DISCOMFORT
DESCRIPTORS: SORE;DISCOMFORT
DESCRIPTORS: CRAMPING

## 2024-05-22 ASSESSMENT — PAIN SCALES - GENERAL
PAINLEVEL_OUTOF10: 5 - MODERATE PAIN
PAINLEVEL_OUTOF10: 3
PAINLEVEL_OUTOF10: 2

## 2024-05-22 NOTE — CARE PLAN
The patient's goals for the shift include adequate pain control    The clinical goals for the shift include Pt will continue to meet post op milestones as expected, obtain a therapeutic heparin range, and receive 1 unit of PRBC's  Pt stable meeting post op milestones as expected. S/p 1 unit of PRBC's today. F/U cbc this evening. Continues on iv heparin. Awaiting recent lab draw for heparin level. Plan for discharge this evening if pt and labs remain stable.

## 2024-05-22 NOTE — DISCHARGE INSTR - AVS FIRST PAGE
Call your GYN care provider for fever/chills (100.4 degrees F or greater).  Nausea/vomiting.  Increased pain.  Bright red heavy vaginal bleeding.  Incisional redness/drainage/swelling.  Severe constipation/diarrhea.  Difficulty or burning with urination.

## 2024-05-22 NOTE — PROGRESS NOTES
Subjective:  Doing well this morning. Denies leg pain. No shortness of breath, chest pain. Pain well controlled. Voiding without issue. Passed flatus. Tolerating PO.     Had discussed transfusion yesterday, denies si/sx anemia, s/p Iv Iron x1 dose.     Vitals:    05/22/24 0427   BP: 104/67   Pulse: 97   Resp: 18   Temp: 37.2 °C (99 °F)   SpO2: 96%       .General: no acute distress   HEENT: normocephalic, atraumatic   Cards: RRR   Pulm: lungs CTAB   Abdomen: soft, appropriate tenderness, midline vertical clean and dry, port sites c/d/I with steris  Extremities: no LE edema, neg brody's sign  Neuro: no focal deficits      .        Results for orders placed or performed during the hospital encounter of 05/20/24 (from the past 24 hour(s))   Prepare RBC: 1 Units   Result Value Ref Range     PRODUCT CODE S8755A08       Unit Number [id number]       Unit ABO A       Unit RH POS       XM INTEP COMP       Dispense Status XM       Blood Expiration Date May 31, 2024 23:59 EDT       PRODUCT BLOOD TYPE 6200       UNIT VOLUME 350     POCT pregnancy, urine manually resulted   Result Value Ref Range     Preg Test, Ur Negative Negative   Prepare RBC: 2 Units   Result Value Ref Range     PRODUCT CODE H0355W55       Unit Number [id number]       Unit ABO A       Unit RH POS       XM INTEP COMP       Dispense Status XM       Blood Expiration Date May 31, 2024 23:59 EDT       PRODUCT BLOOD TYPE 6200       UNIT VOLUME 350       PRODUCT CODE U1562Z51       Unit Number [id number]       Unit ABO A       Unit RH POS       XM INTEP COMP       Dispense Status TR       Blood Expiration Date May 30, 2024 23:59 EDT       PRODUCT BLOOD TYPE 6200       UNIT VOLUME 350     Verify ABO/Rh Group Test (VERAB)   Result Value Ref Range     ABO TYPE A       Rh TYPE POS     CBC   Result Value Ref Range     WBC 7.3 4.4 - 11.3 x10*3/uL     nRBC 0.0 0.0 - 0.0 /100 WBCs     RBC 3.59 (L) 4.00 - 5.20 x10*6/uL     Hemoglobin 7.9 (L) 12.0 - 16.0 g/dL      Hematocrit 24.7 (L) 36.0 - 46.0 %     MCV 69 (L) 80 - 100 fL     MCH 22.0 (L) 26.0 - 34.0 pg     MCHC 32.0 32.0 - 36.0 g/dL     RDW 16.2 (H) 11.5 - 14.5 %     Platelets 325 150 - 450 x10*3/uL   Basic Metabolic Panel   Result Value Ref Range     Glucose 138 (H) 74 - 99 mg/dL     Sodium 134 (L) 136 - 145 mmol/L     Potassium 4.4 3.5 - 5.3 mmol/L     Chloride 102 98 - 107 mmol/L     Bicarbonate 25 21 - 32 mmol/L     Anion Gap 11 10 - 20 mmol/L     Urea Nitrogen 10 6 - 23 mg/dL     Creatinine 0.55 0.50 - 1.05 mg/dL     eGFR >90 >60 mL/min/1.73m*2     Calcium 8.0 (L) 8.6 - 10.6 mg/dL   Magnesium   Result Value Ref Range     Magnesium 1.61 1.60 - 2.40 mg/dL   aPTT - baseline   Result Value Ref Range     aPTT 24 (L) 27 - 38 seconds   Protime-INR   Result Value Ref Range     Protime 13.4 (H) 9.8 - 12.8 seconds     INR 1.2 (H) 0.9 - 1.1            A/P  33 y.o who is POD#1 s/p Robotic myomectomy, ovarian cystectomy, left salpingectomy, lysis of adhesions, open appendectomy     Right Posterior Tibial DVT   -post-operative lower extremity soreness with DVT noted on Duplex US   -Given risk of bleeding, plan for low-intensity heparin gtt (will defer bolus)   - Vasc medicine cs:    - remains subtherapeutic on low intensity gtt    - plan to transition to DOAC when therapeutic    - will discuss with Vasc Med today, patient cleared for DC from GYN perspective    .Post-operative State  - Pain controlled on current regimen  - Rod removed in OR, voiding spontaneously   - Regular diet  - Anti-emetics and bowel regimen ordered   - Strict I/Os. UOP   - On low-intensity heparin gtt (see above)  - Encourage incentive spirometry 10x/hr       Acute Blood Loss Anemia   - Admission hgb 8.8-> 1u pRBC intra-op ,  -> POD#0 hgb 7.9 > 7.3. Declined transfusion yesterday, s/p IV Iron.   - Discussed if <7, recommend blood transfusion. Pt amendable.   - vitals wnl, asx; continue to monitor    Raquel Mckenzie MD   Seen and d/w Dr. Huffman

## 2024-05-22 NOTE — PROGRESS NOTES
Subjective:  Doing well this morning. Leg is feeling back to normal, denies pain in her leg or cramping. No shortness of breath, chest pain. Pain well controlled. Voiding without issue. Not yet passed flatus. Tolerating PO.         5/21/2024     7:38 AM 5/21/2024    11:30 AM 5/21/2024     3:25 PM 5/21/2024     8:05 PM 5/21/2024    11:50 PM 5/21/2024    11:57 PM 5/22/2024     4:27 AM   Vitals   Systolic 106 105 102 96 111 105 104   Diastolic 72 70 69 62 68 63 67   Heart Rate 112 108 106 101 105 104 97   Temp 36.2 °C (97.2 °F) 36.5 °C (97.7 °F) 36.4 °C (97.5 °F) 37.3 °C (99.1 °F) 36.9 °C (98.4 °F) 37 °C (98.6 °F) 37.2 °C (99 °F)   Resp 20 18 16 16 16 16 18       .General: no acute distress   HEENT: normocephalic, atraumatic   Cards: RRR   Pulm: lungs CTAB   Abdomen: soft, appropriate tenderness, midline vertical clean and dry, port sites c/d/I with steris  Extremities: no LE edema, neg brody's sign  Neuro: no focal deficits      .        Results for orders placed or performed during the hospital encounter of 05/20/24 (from the past 24 hour(s))   Prepare RBC: 1 Units   Result Value Ref Range     PRODUCT CODE Q3480G93       Unit Number [id number]       Unit ABO A       Unit RH POS       XM INTEP COMP       Dispense Status XM       Blood Expiration Date May 31, 2024 23:59 EDT       PRODUCT BLOOD TYPE 6200       UNIT VOLUME 350     POCT pregnancy, urine manually resulted   Result Value Ref Range     Preg Test, Ur Negative Negative   Prepare RBC: 2 Units   Result Value Ref Range     PRODUCT CODE E9283Z15       Unit Number [id number]       Unit ABO A       Unit RH POS       XM INTEP COMP       Dispense Status XM       Blood Expiration Date May 31, 2024 23:59 EDT       PRODUCT BLOOD TYPE 6200       UNIT VOLUME 350       PRODUCT CODE Q0228U42       Unit Number [id number]       Unit ABO A       Unit RH POS       XM INTEP COMP       Dispense Status TR       Blood Expiration Date May 30, 2024 23:59 EDT        PRODUCT BLOOD TYPE 6200       UNIT VOLUME 350     Verify ABO/Rh Group Test (VERAB)   Result Value Ref Range     ABO TYPE A       Rh TYPE POS     CBC   Result Value Ref Range     WBC 7.3 4.4 - 11.3 x10*3/uL     nRBC 0.0 0.0 - 0.0 /100 WBCs     RBC 3.59 (L) 4.00 - 5.20 x10*6/uL     Hemoglobin 7.9 (L) 12.0 - 16.0 g/dL     Hematocrit 24.7 (L) 36.0 - 46.0 %     MCV 69 (L) 80 - 100 fL     MCH 22.0 (L) 26.0 - 34.0 pg     MCHC 32.0 32.0 - 36.0 g/dL     RDW 16.2 (H) 11.5 - 14.5 %     Platelets 325 150 - 450 x10*3/uL   Basic Metabolic Panel   Result Value Ref Range     Glucose 138 (H) 74 - 99 mg/dL     Sodium 134 (L) 136 - 145 mmol/L     Potassium 4.4 3.5 - 5.3 mmol/L     Chloride 102 98 - 107 mmol/L     Bicarbonate 25 21 - 32 mmol/L     Anion Gap 11 10 - 20 mmol/L     Urea Nitrogen 10 6 - 23 mg/dL     Creatinine 0.55 0.50 - 1.05 mg/dL     eGFR >90 >60 mL/min/1.73m*2     Calcium 8.0 (L) 8.6 - 10.6 mg/dL   Magnesium   Result Value Ref Range     Magnesium 1.61 1.60 - 2.40 mg/dL   aPTT - baseline   Result Value Ref Range     aPTT 24 (L) 27 - 38 seconds   Protime-INR   Result Value Ref Range     Protime 13.4 (H) 9.8 - 12.8 seconds     INR 1.2 (H) 0.9 - 1.1            A/P  33 y.o who is POD#1 s/p Robotic myomectomy, ovarian cystectomy, left salpingectomy, lysis of adhesions, open appendectomy     Right Posterior Tibial DVT   -post-operative lower extremity soreness with DVT noted on Duplex US   -Given risk of bleeding, plan for low-intensity heparin gtt (will defer bolus)   - Vasc medicine cs, will follow up recs    .Post-operative State  - Pain controlled on current regimen  - Rod removed in OR, voiding spontaneously   - Regular diet  - Anti-emetics and bowel regimen ordered   - LR 40cc/hr for maintenance fluids; DC today  - Strict I/Os. UOP   - On low-intensity heparin gtt (see above)  - Encourage incentive spirometry 10x/hr       Acute Blood Loss Anemia   - Admission hgb 8.8-> 1u pRBC intra-op ,  -> POD#0 hgb 7.9,  patient is likely equilibrating possibly will need additional RBC in the AM.  Goal Hgb >7. AM labs pending   - Plan for IV Iron if no transfusion  - vitals wnl, asx; continue to monitor    Raquel Mckenzie MD   Seen and d/w Dr. Nguyen

## 2024-05-23 LAB
BLOOD EXPIRATION DATE: NORMAL
BLOOD EXPIRATION DATE: NORMAL
DISPENSE STATUS: NORMAL
DISPENSE STATUS: NORMAL
PRODUCT BLOOD TYPE: 6200
PRODUCT BLOOD TYPE: 6200
PRODUCT CODE: NORMAL
PRODUCT CODE: NORMAL
UNIT ABO: NORMAL
UNIT ABO: NORMAL
UNIT NUMBER: NORMAL
UNIT NUMBER: NORMAL
UNIT RH: NORMAL
UNIT RH: NORMAL
UNIT VOLUME: 350
UNIT VOLUME: 350
XM INTEP: NORMAL
XM INTEP: NORMAL

## 2024-05-23 NOTE — DISCHARGE INSTRUCTIONS
Eliquis is a blood thinner used to prevent or treat blood clots. You should take this medication every day. Please take your first dose first thing in the morning on 5/23.   It is important that you continue your Eliquis until told to stop.  Let everyone involved in your care, such as a dentist or other provider, know that you are taking Eliquis.    Signs of bleeding that you should call your doctor:   - Gums that bleed after brushing your teeth lasting more than 15 minutes.  - A nose bleed that lasts for more than 15 minutes.  - Bleeding from a cut that does not stop in 15 minutes.  - Urine that looks red, or urine that is dark like coffee.  - Stool (BMs) that are covered with blood, or are black.  - Vomit that is bright red or vomit that looks like coffee.

## 2024-05-23 NOTE — CARE PLAN
The patient's goals for the shift include adequate pain control    The clinical goals for the shift include patient hemoglobin will increase s/p 1 unit of prbc

## 2024-05-23 NOTE — DISCHARGE SUMMARY
Discharge Summary    Admission Date: 5/20/2024  Discharge Date: 5/22/24    Discharge Diagnosis  Fibroid    Hospital Course  Patient underwent robotic myomectomy, ovarian cystectomy, L salpingectomy, lysis of adhesions and open appendectomy on 5/20. . Received 1u pRBC intra-op. Hgb pre op: 8.8, 1u intra-op, post op labs 7.9. AM --> 6.3. Received 1u pRBC and post transfusion labs showed hgb 7.5, appropriate.      Diagnosed with occlusive thrombosis of R posterior tibial vein. Vascular medicine consulted, hep gtt initiated and transitioned to Eliquis on POD 2. Will follow up with Vascular medicine  outpatient.     Meeting post op milestones at time of discharge. Will also follow up with CRS outpatient.    Pertinent Physical Exam At Time of Discharge  General: no acute distress   HEENT: normocephalic, atraumatic   Cards: RRR   Pulm: lungs CTAB   Abdomen: soft, appropriate tenderness, midline vertical clean and dry, port sites c/d/I with steris  Extremities: no LE edema, neg brody's sign  Neuro: no focal deficits     Discharge Meds     Your medication list        START taking these medications        Instructions Last Dose Given Next Dose Due    mg tablet  Generic drug: docusate sodium      Take 1 tablet (100 mg) by mouth 2 times a day for 10 doses.       Eliquis DVT-PE Treat 30D Start 5 mg (74 tabs) tablet  Generic drug: apixaban      Take 2 tablets (10 mg) by mouth 2 times a day for 7 days, then take 1 tablet (5 mg) by mouth 2 times a day.       ondansetron 4 mg tablet  Commonly known as: Zofran      Take 1 tablet (4 mg) by mouth every 6 hours if needed for nausea for up to 20 doses.       oxyCODONE 5 mg immediate release tablet  Commonly known as: Roxicodone      Take 1 tablet (5 mg) by mouth every 6 hours if needed for severe pain (7 - 10) for up to 12 doses.              CONTINUE taking these medications        Instructions Last Dose Given Next Dose Due   acetaminophen 325 mg tablet  Commonly known as:  Tylenol      Take 2 tablets (650 mg) by mouth every 6 hours if needed for mild pain (1 - 3) for up to 20 doses.       drospirenone (contraceptive) 4 mg (28) tablet      Take 1 tablet by mouth once daily.       FeroSuL tablet  Generic drug: ferrous sulfate (325 mg ferrous sulfate)      TAKE 1 TABLET BY MOUTH TWICE DAILY WITH MEALS.              STOP taking these medications      chlorhexidine 4 % external liquid  Commonly known as: Hibiclens        ibuprofen 600 mg tablet                  Where to Get Your Medications        These medications were sent to [facility]/pharmacy #[id number] - [place], [place] - [address] AT [facility]  [address], [facility] [address]      Phone: [phone]   acetaminophen 325 mg tablet       These medications were sent to [facility]  [address], [address] [address]      Hours: 8AM to 6PM Mon-Fri, 8AM to 4PM Sat, 9AM to 1PM Sun Phone: [phone]    mg tablet  Eliquis DVT-PE Treat 30D Start 5 mg (74 tabs) tablet  ondansetron 4 mg tablet  oxyCODONE 5 mg immediate release tablet        Test Results Pending At Discharge  Pending Labs       Order Current Status    POCT pregnancy, urine manually resulted In process    Surgical Pathology Exam In process            Outpatient Follow-Up  Future Appointments   Date Time Provider Department Center   6/11/2024 10:00 AM ALEXANDRU Andrade-CNP [id number] [facility]   6/19/2024  1:00 PM Scarlett Park MD [id number] [facility]   8/20/2024  1:00 PM Ar Burroughs MD [id number] [place]         Cony Desai MD

## 2024-05-25 LAB
BLOOD EXPIRATION DATE: NORMAL
DISPENSE STATUS: NORMAL
PRODUCT BLOOD TYPE: 6200
PRODUCT CODE: NORMAL
UNIT ABO: NORMAL
UNIT NUMBER: NORMAL
UNIT RH: NORMAL
UNIT VOLUME: 350
XM INTEP: NORMAL

## 2024-05-29 LAB
LABORATORY COMMENT REPORT: NORMAL
PATH REPORT.FINAL DX SPEC: NORMAL
PATH REPORT.GROSS SPEC: NORMAL
PATH REPORT.TOTAL CANCER: NORMAL

## 2024-06-10 NOTE — PROGRESS NOTES
Chief Compliant:  POV      History Of Present Illness  Daria Isaac is a 33 y.o. female who underwent robotic myomectomy, ovarian cystectomy, L salpingectomy, lysis of adhesions and open appendectomy on 5/20/24.    A. Left pelvic sidewall, excision:  - Endometriosis.  B. Left fallopian tube, salpingectomy:  - Fallopian tube with serosal endometriosis and paratubal cysts.  C. Right pelvic sidewall, excision:  - Fibroadipose tissue.   D. Rectal nodule, excision:  - Endometriosis.  E. Fibroid myomectomy:  - Infarcted leiomyoma.   F. Ovarian cyst wall, excision:  - Fibrous tissue with chronic inflammation; no definitive cystic epithelium is identified.      G. Appendix, appendectomy:  - Appendiceal tissue with muscularis propria and serosal endometriosis.      Diagnosed with occlusive thrombosis of R posterior tibial vein. Vascular medicine consulted, hep gtt initiated and transitioned to Eliquis on POD 2.     Denies any F/C, N/V, CP/SOB, dysuria.   Appetite: Good  Energy: Good  Weight: Stable  She is scheduled to FU with GYN 6/19/24     Physical Exam  Constitutional: Well developed, awake/alert/oriented x3, no distress, alert and cooperative   Eyes: Sclera anicteric, no conjunctival inflammation, conjugate gaze   ENMT: mucous membranes moist, no apparent injury,   Head/Neck: Neck supple, no apparent injury, trachea midline, no bruits   Respiratory/Thorax: Patent airways, CTAB, normal breath sounds with good chest expansion  Cardiovascular: Regular, rate and rhythm, no murmurs, normal S1 and S2   Gastrointestinal: Nondistended, soft, non-tender. Incision is healed   Extremities: normal extremities, no edema  Neurological: alert and oriented x3, normal strength, Normal gait   Psychological: Appropriate mood and behavior   Skin: Warm and dry, no lesions, no rashes     Last Recorded Vitals  /90 (BP Location: Left arm, Patient Position: Sitting, BP Cuff Size: Large adult)   Pulse 91   Temp 36.3 °C (97.3 °F)  "(Temporal)   Ht 1.778 m (5' 10\")   Wt 111 kg (244 lb)   LMP 05/20/2024   SpO2 100%   BMI 35.01 kg/m²       Assessment:  S/p robotic myomectomy, ovarian cystectomy, L salpingectomy, lysis of adhesions and open appendectomy on 5/20/24  DVT  Doing well    Plan:  -Diet: as tolerated  -Activity: Normal activities can be resumed. No lifting greater than 10 lbs for a full 6 weeks following surgery   -Follow-up with Gyn. FU with Dr. Zhao or me PRN  -All questions and concerns were answered. Encouraged to call with any question or concerns.      Krupa Quach, APRN-CNP    "

## 2024-06-11 ENCOUNTER — APPOINTMENT (OUTPATIENT)
Dept: SURGERY | Facility: CLINIC | Age: 34
End: 2024-06-11
Payer: COMMERCIAL

## 2024-06-11 ENCOUNTER — OFFICE VISIT (OUTPATIENT)
Dept: SURGERY | Facility: CLINIC | Age: 34
End: 2024-06-11
Payer: COMMERCIAL

## 2024-06-11 VITALS
HEART RATE: 91 BPM | BODY MASS INDEX: 34.93 KG/M2 | SYSTOLIC BLOOD PRESSURE: 142 MMHG | WEIGHT: 244 LBS | TEMPERATURE: 97.3 F | HEIGHT: 70 IN | OXYGEN SATURATION: 100 % | DIASTOLIC BLOOD PRESSURE: 90 MMHG

## 2024-06-11 DIAGNOSIS — N80.9 ENDOMETRIOSIS: Primary | ICD-10-CM

## 2024-06-11 PROCEDURE — 1036F TOBACCO NON-USER: CPT | Performed by: NURSE PRACTITIONER

## 2024-06-11 PROCEDURE — 99024 POSTOP FOLLOW-UP VISIT: CPT | Performed by: NURSE PRACTITIONER

## 2024-06-11 ASSESSMENT — PAIN SCALES - GENERAL: PAINLEVEL: 0-NO PAIN

## 2024-06-12 NOTE — PROGRESS NOTES
Division of Minimally Invasive Gynecologic Surgery  [facility]    06/12/24 Gynecology Visit    Daria Isaac is a 33 y.o. status post robotic myomectomy, ovarian cystectomy, L salpingectomy, excision of endometriosis, ELISE --> XL for appendectomy (Cece).     Overall recovering well, meeting all milestones. She reports she has just started her first period since surgery and it is extremely painful.     PMHx, PSHx, SHx, Allergies, and Medications updated in Epic.    ROS: reviewed and negative    PE:/90   Wt 111 kg (245 lb)   LMP 06/18/2024 (Exact Date)   BMI 35.15 kg/m²    Constitutional:  No acute distress  HEENT: EOM grossly intact, MMM, neck supple and with full ROM  Pulm:  Effort normal. No accessory muscle usage.  No respiratory distress.  Abd: soft, non-distended, non-tender, no palpable masses, incisions c/d/I, healing well   Neurological:  AAO x 3, appropriate to interview  Skin: Warm, no pallor.  Psychiatric:  normal mood and affect.    Assessment/Plan:     Daria Isaac is a 33 y.o. status post robotic myomectomy, ovarian cystectomy, L salpingectomy, excision of endometriosis, ELISE --> XL for appendectomy (Cece).   - Recovering well, no concerns  - Path reviewed, benign, extensive endometriosis including to the muscularis level of the appendix   - Continue post op restrictions until 6 weeks after surgery, reviewed these today  - Starting Slynd now   - RTC in 1 year, earlier PRN       Scarlett Park MD  Division of Minimally Invasive Gynecologic Surgery  [facility]

## 2024-06-14 NOTE — OP NOTE
Robotic myomectomy, ovarian cystectomy, left salpingectomy, lysis of adhesions, OPEN Appendectomy Operative Note     Date: 2024  OR Location: [facility] OR    Name: Daria Isaac : 1990, Age: 33 y.o., MRN: [id number], Sex: female    Diagnosis  Pre-op Diagnosis     * Fibroid [D21.9]     * Endometriosis [N80.9]     * Cyst of ovary, unspecified laterality [N83.209] Post-op Diagnosis     * Fibroid [D21.9]     * Endometriosis [N80.9]     * Cyst of ovary, unspecified laterality [N83.209]     Procedures  appendectomy    Surgeons   Natanael Zhao MD    Resident/Fellow/Other Assistant:      Procedure Summary  Anesthesia: General  ASA: II  Anesthesia Staff: Anesthesiologist: Elsa Aden MD  CRNA: SEGUNDO LandersCRNA; SEGUNDO MoralesCRNA  Estimated Blood Loss: 1mL  Intra-op Medications:   Administrations occurring from 0715 to 1115 on 24:   Medication Name Total Dose   surgical lubricant gel 1 Application   sodium chloride 0.9 % irrigation solution 1,000 mL   miSOPROStoL (Cytotec) tablet  - Omnicell Override Pull 200 mcg   miSOPROStoL (Cytotec) tablet  - Omnicell Override Pull 200 mcg   sterile water irrigation solution 1,000 mL   vasopressin (Vasostrict) injection  - Omnicell Override Pull 20 Units              Anesthesia Record               Intraprocedure I/O Totals          Intake    PRBC 350.00 mL    LR infusion 500.00 mL    lactated Ringer's 1300.00 mL    Total Intake 2150 mL       Output    Urine 300 mL    Est. Blood Loss 650 mL    Total Output 950 mL       Net    Net Volume 1200 mL          Specimen:   ID Type Source Tests Collected by Time   1 : left pelvic side wall Tissue PELVIC SIDE WALL EXCISION SURGICAL PATHOLOGY EXAM Scarlett Park MD 2024 1141   2 : Left Fallopian tube Tissue FALLOPIAN TUBE SALPINGECTOMY LEFT SURGICAL PATHOLOGY EXAM Scarlett Park MD 2024 1150   3 : right pelvic side wall Tissue PELVIC SIDE WALL EXCISION SURGICAL PATHOLOGY EXAM Scarlett BELTRAN  MD Xavier 5/20/2024 1232   4 : rectal nodule Tissue SOFT TISSUE RESECTION SURGICAL PATHOLOGY EXAM Scarlett Park MD 5/20/2024 1236   5 : Uterine Fibroid Tissue FIBROID MYOMECTOMY SURGICAL PATHOLOGY EXAM Scarlett Park MD 5/20/2024 1341   6 : Ovarian cyst wall Tissue CYST OVARY SURGICAL PATHOLOGY EXAM Scarlett Park MD 5/20/2024 1342   7 : Appendix Tissue APPENDIX SURGICAL PATHOLOGY EXAM Scarlett Park MD 5/20/2024 1349        Staff:   Circulator: Cameron  Scrub Person: Emeterio Newell Scrub: Jonnathan Newell Circulator: Camden Thompsonub Person: Derrick  Circulator: Scarlett  Circulator: [place]  Circulator: Mana         Drains and/or Catheters:   [REMOVED] Urethral Catheter 16 Fr. (Removed)       Tourniquet Times:         Implants:     Findings: appendix thickened, serositis    Indications: Daria Isaac is an 33 y.o. female who is having surgery for Fibroid [D21.9]  Endometriosis [N80.9]  Cyst of ovary, unspecified laterality [N83.209].  Patient found to have abnormal appearing appendix so at the time of the above operation.  I was asked to consult intraoperatively and perform appendectomy.    The patient was seen in the preoperative area. The risks, benefits, complications, treatment options, non-operative alternatives, expected recovery and outcomes were discussed with the patient. The possibilities of reaction to medication, pulmonary aspiration, injury to surrounding structures, bleeding, recurrent infection, the need for additional procedures, failure to diagnose a condition, and creating a complication requiring transfusion or operation were discussed with the patient. The patient concurred with the proposed plan, giving informed consent.  The site of surgery was properly noted/marked if necessary per policy. The patient has been actively warmed in preoperative area. Preoperative antibiotics have been ordered and given within 1 hours of incision. Venous thrombosis prophylaxis have been ordered including  bilateral sequential compression devices and chemical prophylaxis    Procedure Details: Laparotomy had already been performed by the gynecology oncology service.  I scrubbed into the procedure and inspected the appendix.  It was abnormal in appearance.  It was thickened and with severe serositis.  Appendectomy was then performed in the usual fashion.  The base of the appendix was transected with the TA 30 stapler.  The mesoappendix was divided between clamps and was ligated.  The appendix was removed from the operative field and sent to pathology.  The appendiceal stump was then oversewn with 3-0 Vicryl Lembert sutures.  Case was then turned back to the gynecology oncology service for completion.  Complications:  None; patient tolerated the procedure well.    Disposition:  turned back to Gyn Onc team  Condition: stable         Additional Details: none    Attending Attestation: I was present and scrubbed for the entire procedure.    Natanael Zhao MD

## 2024-06-19 ENCOUNTER — APPOINTMENT (OUTPATIENT)
Dept: OBSTETRICS AND GYNECOLOGY | Facility: CLINIC | Age: 34
End: 2024-06-19
Payer: COMMERCIAL

## 2024-06-19 VITALS — SYSTOLIC BLOOD PRESSURE: 140 MMHG | DIASTOLIC BLOOD PRESSURE: 90 MMHG | BODY MASS INDEX: 35.15 KG/M2 | WEIGHT: 245 LBS

## 2024-06-19 DIAGNOSIS — D50.0 IRON DEFICIENCY ANEMIA DUE TO CHRONIC BLOOD LOSS: ICD-10-CM

## 2024-06-19 DIAGNOSIS — G89.18 POST-OP PAIN: Primary | ICD-10-CM

## 2024-06-19 PROCEDURE — 99024 POSTOP FOLLOW-UP VISIT: CPT | Performed by: STUDENT IN AN ORGANIZED HEALTH CARE EDUCATION/TRAINING PROGRAM

## 2024-06-19 RX ORDER — FERROUS SULFATE 325(65) MG
1 TABLET ORAL
Qty: 180 TABLET | Refills: 3 | Status: SHIPPED | OUTPATIENT
Start: 2024-06-19 | End: 2025-06-19

## 2024-06-19 RX ORDER — LIDOCAINE 560 MG/1
1 PATCH PERCUTANEOUS; TOPICAL; TRANSDERMAL DAILY
Qty: 30 PATCH | Refills: 1 | Status: SHIPPED | OUTPATIENT
Start: 2024-06-19

## 2024-06-19 RX ORDER — OXYCODONE HYDROCHLORIDE 5 MG/1
5 TABLET ORAL EVERY 6 HOURS PRN
Qty: 15 TABLET | Refills: 0 | Status: SHIPPED | OUTPATIENT
Start: 2024-06-19 | End: 2024-06-26

## 2024-06-19 ASSESSMENT — ENCOUNTER SYMPTOMS
NEUROLOGICAL NEGATIVE: 0
EYES NEGATIVE: 0
RESPIRATORY NEGATIVE: 0
CARDIOVASCULAR NEGATIVE: 0
PSYCHIATRIC NEGATIVE: 0
ALLERGIC/IMMUNOLOGIC NEGATIVE: 0
CONSTITUTIONAL NEGATIVE: 0
HEMATOLOGIC/LYMPHATIC NEGATIVE: 0
GASTROINTESTINAL NEGATIVE: 0
ENDOCRINE NEGATIVE: 0
MUSCULOSKELETAL NEGATIVE: 0

## 2024-06-19 ASSESSMENT — PAIN SCALES - GENERAL: PAINLEVEL: 5

## 2024-08-20 ENCOUNTER — APPOINTMENT (OUTPATIENT)
Dept: CARDIOLOGY | Facility: CLINIC | Age: 34
End: 2024-08-20
Payer: COMMERCIAL

## (undated) DEVICE — PROTECTOR, NERVE, ULNAR, PINK

## (undated) DEVICE — DRESSING, NON-ADHERENT, TELFA, 3 X 8 IN, NS

## (undated) DEVICE — SEALANT, HEMOSTATIC, FLOSEAL, 10 ML

## (undated) DEVICE — STRIP, SKIN CLOSURE, STERI STRIP, REINFORCED, 0.5 X 4 IN

## (undated) DEVICE — FORCEPS, BIPOLAR MARYLAND, DAVINCI XI

## (undated) DEVICE — DRAPE, ARM XI

## (undated) DEVICE — GOWN, SURGICAL, SMARTGOWN, XLARGE, STERILE

## (undated) DEVICE — ACCESS PORT, 8M M, LOW PROFILE W/BLADELESS OPTICAL TIP, 100MM LENGTH

## (undated) DEVICE — SYRINGE, 60 CC, IRRIGATION, BULB, CONTRO-BULB, PAPER POUCH

## (undated) DEVICE — POSITIONING, THE PINK PAD, PIGAZZI SYSTEM

## (undated) DEVICE — MANIFOLD, 4 PORT NEPTUNE STANDARD

## (undated) DEVICE — DRESSING, NON-ADHERENT, TELFA, OUCHLESS, 3 X 8 IN, STERILE

## (undated) DEVICE — SCISSORS, MONOPOLAR, CURVED, 8MM

## (undated) DEVICE — KIT, ROBOTIC, CUSTOM UHC

## (undated) DEVICE — TOWELS 4-PK

## (undated) DEVICE — SURGISLEEVE, WOUND PROTECTOR, LARGE 9-14CM

## (undated) DEVICE — ADHESIVE, SKIN, MASTISOL, 2/3 CC VIAL

## (undated) DEVICE — IRRIGATION SET, CYSTOSCOPY, F/CONSTANT/INTERMITTENT, 8 GTT/CC, 77 IN

## (undated) DEVICE — COVER, TIP HOT SHEARS ENDOWRIST

## (undated) DEVICE — APPLICATOR, ENDOSCOPIC FLOSEAL

## (undated) DEVICE — PREP TRAY, SKIN, DRY, W/GLOVES

## (undated) DEVICE — FORCEPS, PROGRASP, DAVINCI XI

## (undated) DEVICE — SYRINGE, 60 CC, LUER LOCK, MONOJECT

## (undated) DEVICE — CATHETER, URETHRAL, FOLEY, 2 WAY, 16 FR, 5 CC, SILICONE

## (undated) DEVICE — RETRACTOR, CERVICAL CUP, VCARE, STANDARD

## (undated) DEVICE — SEAL, UNIVERSAL 5-8MM  XI

## (undated) DEVICE — SUTURE, PDS, 0, 18 IN, LIGATING LOOP, VIOLET

## (undated) DEVICE — DRAPE, PAD, PREP, W/ 9 IN CUFF, 24 X 41, LF, NS

## (undated) DEVICE — DRAPE PACK, LAVH, W/ATTACHED LEGGINGS, W/POUCH, 100 X 114 IN, LF, STERILE

## (undated) DEVICE — CAUTERY, PENCIL, PUSH BUTTON, SMOKE EVAC, 70MM

## (undated) DEVICE — DRAPE, LEGGINGS, 48 X 31 IN, STERILE, LF

## (undated) DEVICE — OBTURATOR, BLADELESS , SU

## (undated) DEVICE — SUTURE, MONOCRYL PLUS, 4-0, PS-2 UD 27IN

## (undated) DEVICE — DRAPE, COLUMN, DAVINCI XI

## (undated) DEVICE — TUBING SET, TRI-LUMEN, FILTERED, F/AIRSEAL

## (undated) DEVICE — DRAPE, UNDERBUTTOCKS

## (undated) DEVICE — DRESSING, ADHESIVE, ISLAND, TELFA, 2 X 3.75 IN, LF

## (undated) DEVICE — BAG, DRAIN METER, URINE, 350CC, LF

## (undated) DEVICE — BAG, TISSUE RETRIEVAL, 15MM, ANCHOR

## (undated) DEVICE — STAPLER, LINEAR, RELOADABLE, 60MM 4.8, GREEN

## (undated) DEVICE — TROCAR SYSTEM, BALLOON, KII GELPORT, 12 X 100MM

## (undated) DEVICE — OCCLUDER, COLPO-PNEUMO

## (undated) DEVICE — SUTURE, VICRYL, 0, 27 IN, UR-6, VIOLET